# Patient Record
Sex: MALE | Race: WHITE | NOT HISPANIC OR LATINO | Employment: FULL TIME | URBAN - METROPOLITAN AREA
[De-identification: names, ages, dates, MRNs, and addresses within clinical notes are randomized per-mention and may not be internally consistent; named-entity substitution may affect disease eponyms.]

---

## 2024-07-26 ENCOUNTER — OFFICE VISIT (OUTPATIENT)
Dept: GASTROENTEROLOGY | Facility: CLINIC | Age: 49
End: 2024-07-26
Payer: COMMERCIAL

## 2024-07-26 VITALS
BODY MASS INDEX: 35.68 KG/M2 | DIASTOLIC BLOOD PRESSURE: 72 MMHG | HEIGHT: 68 IN | SYSTOLIC BLOOD PRESSURE: 130 MMHG | HEART RATE: 81 BPM | TEMPERATURE: 97.7 F | WEIGHT: 235.4 LBS | OXYGEN SATURATION: 97 %

## 2024-07-26 DIAGNOSIS — K21.9 GASTROESOPHAGEAL REFLUX DISEASE, UNSPECIFIED WHETHER ESOPHAGITIS PRESENT: Primary | ICD-10-CM

## 2024-07-26 DIAGNOSIS — Z12.11 SCREENING FOR COLON CANCER: ICD-10-CM

## 2024-07-26 DIAGNOSIS — R13.10 DYSPHAGIA, UNSPECIFIED TYPE: ICD-10-CM

## 2024-07-26 PROCEDURE — 99204 OFFICE O/P NEW MOD 45 MIN: CPT | Performed by: PHYSICIAN ASSISTANT

## 2024-07-26 RX ORDER — HYDROCORTISONE 10 MG/1
TABLET ORAL
COMMUNITY

## 2024-07-26 RX ORDER — ROSUVASTATIN CALCIUM 40 MG/1
40 TABLET, COATED ORAL DAILY
COMMUNITY

## 2024-07-26 RX ORDER — DIPHENHYDRAMINE HCL 50 MG
CAPSULE ORAL DAILY
COMMUNITY

## 2024-07-26 NOTE — PROGRESS NOTES
[facility] - Outpatient Consultation  Mart Edward 49 y.o. male MRN: [id number]  Encounter: [id number]          ASSESSMENT AND PLAN:      1. Gastroesophageal reflux disease  2. Dysphagia    Patient reports a long hx of GERD x 25 years.  He also has a long hx of occasional dysphagia.  He is on Prevacid OTC with good control of his heartburn.  He has never had an EGD.    Will plan for EGD to investigate for Rg's esophagus, HH, strictures/rings, etc.  Continue the PPI. GERD modifications.    3. Screening for colon cancer    He has never had a colonoscopy. No family history of colon cancer.    Will plan for colonoscopy at the same time as the EGD to investigate.    Note: He has a hx of a CM with EF of 35-39% and his last cardiologist had recommended a cardiac cath which he has not had yet.  He has a new appointment with our Cardiology team through [facility]. He will require cardiac clearance prior to the procedures.    ______________________________________________________________________    HPI:  Patient is a pleasant 49 year old male with a PMH of CAD, cardiomyopathy, COPD, rito disease who presents to the office to establish GI care. Patient reports a long history of GERD x 25 years.  He also has a long history of occasional dysphagia. He is on Prevacid OTC with good control of his heartburn. He previously was on Omeprazole but stopped it as he suspected it was causing memory issues. He has never had an EGD.  He has never had a colonoscopy. No family history of esophageal, stomach, or colon cancer.  No bloody stools.  Note: He has a history of a cardiomyopathy with EF of 35-39% and his last cardiologist had recommended a cardiac cath which he has not had yet.  He has a new appointment with our Cardiology team through [facility].      REVIEW OF SYSTEMS:    CONSTITUTIONAL: Denies any fever, chills, rigors, and weight loss.  HEENT: No earache or tinnitus. Denies hearing loss or  "visual disturbances.  CARDIOVASCULAR: No chest pain or palpitations.   RESPIRATORY: Denies any cough, hemoptysis, shortness of breath or dyspnea on exertion.  GASTROINTESTINAL: As noted in the History of Present Illness.   GENITOURINARY: No problems with urination. Denies any hematuria or dysuria.  NEUROLOGIC: No dizziness or vertigo, denies headaches.   MUSCULOSKELETAL: Denies any muscle or joint pain.   SKIN: Denies skin rashes or itching.   ENDOCRINE: Denies excessive thirst. Denies intolerance to heat or cold.  PSYCHOSOCIAL: Denies depression or anxiety. Denies any recent memory loss.       Historical Information   Past Medical History:   Diagnosis Date    Ryne disease (HCC)     COPD, mild (HCC)     Heart disease      Past Surgical History:   Procedure Laterality Date    CORONARY ARTERY BYPASS GRAFT  2014     Social History   Social History     Substance and Sexual Activity   Alcohol Use Never     Social History     Substance and Sexual Activity   Drug Use Yes    Types: Marijuana     Social History     Tobacco Use   Smoking Status Some Days    Types: Cigarettes   Smokeless Tobacco Never   Tobacco Comments    cigars     Family History   Problem Relation Age of Onset    Breast cancer Mother     Heart failure Mother     Heart disease Father     Diabetes type II Father     Diabetes type II Brother        Meds/Allergies       Current Outpatient Medications:     diphenhydrAMINE HCl, Sleep, (Unisom Sleepgels) 50 MG CAPS    hydrocortisone (CORTEF) 10 mg tablet    rosuvastatin (CRESTOR) 40 MG tablet    No Known Allergies        Objective     Blood pressure 130/72, pulse 81, temperature 97.7 °F (36.5 °C), temperature source Temporal, height 5' 8\" (1.727 m), weight 107 kg (235 lb 6.4 oz), SpO2 97%. Body mass index is 35.79 kg/m².        PHYSICAL EXAM:      General Appearance:   Alert, cooperative, no distress   HEENT:   Normocephalic, atraumatic, anicteric.     Neck:  Supple, symmetrical, trachea midline   Lungs:   " Clear to auscultation bilaterally; no rales, rhonchi or wheezing; respirations unlabored    Heart::   Regular rate and rhythm; no murmur, rub, or gallop.   Abdomen:   Soft, non-tender, non-distended; normal bowel sounds; no masses, no organomegaly    Genitalia:   Deferred    Rectal:   Deferred    Extremities:  No cyanosis, clubbing or edema    Pulses:  2+ and symmetric    Skin:  No jaundice, rashes, or lesions    Lymph nodes:  No palpable cervical lymphadenopathy        Lab Results:   No visits with results within 1 Day(s) from this visit.   Latest known visit with results is:   No results found for any previous visit.         Radiology Results:   No results found.

## 2024-08-23 ENCOUNTER — OFFICE VISIT (OUTPATIENT)
Age: 49
End: 2024-08-23
Payer: COMMERCIAL

## 2024-08-23 VITALS
RESPIRATION RATE: 18 BRPM | BODY MASS INDEX: 34.77 KG/M2 | DIASTOLIC BLOOD PRESSURE: 74 MMHG | SYSTOLIC BLOOD PRESSURE: 122 MMHG | OXYGEN SATURATION: 95 % | HEART RATE: 79 BPM | WEIGHT: 229.4 LBS | HEIGHT: 68 IN | TEMPERATURE: 96.9 F

## 2024-08-23 DIAGNOSIS — R56.9 SEIZURE (HCC): ICD-10-CM

## 2024-08-23 DIAGNOSIS — F17.290 CIGAR SMOKER: ICD-10-CM

## 2024-08-23 DIAGNOSIS — K21.9 GASTROESOPHAGEAL REFLUX DISEASE WITHOUT ESOPHAGITIS: ICD-10-CM

## 2024-08-23 DIAGNOSIS — Z11.59 NEED FOR HEPATITIS C SCREENING TEST: ICD-10-CM

## 2024-08-23 DIAGNOSIS — J44.9 CHRONIC OBSTRUCTIVE PULMONARY DISEASE, UNSPECIFIED COPD TYPE (HCC): ICD-10-CM

## 2024-08-23 DIAGNOSIS — Z11.4 SCREENING FOR HIV (HUMAN IMMUNODEFICIENCY VIRUS): ICD-10-CM

## 2024-08-23 DIAGNOSIS — I25.810 CORONARY ARTERY DISEASE INVOLVING CORONARY BYPASS GRAFT OF NATIVE HEART WITHOUT ANGINA PECTORIS: ICD-10-CM

## 2024-08-23 DIAGNOSIS — E66.9 CLASS 1 OBESITY WITHOUT SERIOUS COMORBIDITY WITH BODY MASS INDEX (BMI) OF 34.0 TO 34.9 IN ADULT, UNSPECIFIED OBESITY TYPE: ICD-10-CM

## 2024-08-23 DIAGNOSIS — E27.1 ADDISON DISEASE (HCC): ICD-10-CM

## 2024-08-23 DIAGNOSIS — Z76.89 ENCOUNTER TO ESTABLISH CARE: Primary | ICD-10-CM

## 2024-08-23 PROCEDURE — 99204 OFFICE O/P NEW MOD 45 MIN: CPT | Performed by: STUDENT IN AN ORGANIZED HEALTH CARE EDUCATION/TRAINING PROGRAM

## 2024-08-23 RX ORDER — LISINOPRIL 5 MG/1
5 TABLET ORAL DAILY
Qty: 30 TABLET | Refills: 2 | Status: SHIPPED | OUTPATIENT
Start: 2024-08-23 | End: 2024-09-22

## 2024-08-23 RX ORDER — CARVEDILOL 6.25 MG/1
6.25 TABLET ORAL 2 TIMES DAILY WITH MEALS
Qty: 60 TABLET | Refills: 2 | Status: SHIPPED | OUTPATIENT
Start: 2024-08-23 | End: 2024-08-26

## 2024-08-23 RX ORDER — LEVETIRACETAM 500 MG/1
500 TABLET ORAL 2 TIMES DAILY
Qty: 60 TABLET | Refills: 2 | Status: SHIPPED | OUTPATIENT
Start: 2024-08-23 | End: 2024-09-22

## 2024-08-23 RX ORDER — CARVEDILOL 6.25 MG/1
6.25 TABLET ORAL 2 TIMES DAILY WITH MEALS
COMMUNITY
End: 2024-08-23 | Stop reason: SDUPTHER

## 2024-08-23 RX ORDER — LEVETIRACETAM 500 MG/1
500 TABLET ORAL 2 TIMES DAILY
COMMUNITY
End: 2024-08-23 | Stop reason: SDUPTHER

## 2024-08-23 RX ORDER — LISINOPRIL 5 MG/1
5 TABLET ORAL DAILY
COMMUNITY
End: 2024-08-23 | Stop reason: SDUPTHER

## 2024-08-23 RX ORDER — ASPIRIN 81 MG/1
81 TABLET, CHEWABLE ORAL 2 TIMES DAILY
COMMUNITY

## 2024-08-23 NOTE — PROGRESS NOTES
[facility]  INTERNAL MEDICINE OFFICE VISIT     PATIENT INFORMATION     Mart Edward   49 y.o. male   MRN: [id number]    ASSESSMENT/PLAN     Diagnoses and all orders for this visit:    Encounter to establish care  Currently following with Cardiology, Endocrinology. Needs to establish with Neurology.    Coronary artery disease involving coronary bypass graft of native heart without angina pectoris  Patient is in process of switching Cardiologists due to insurance change. He has been having some SOB upon awakening that he is unsure is related to his heart or his lungs. He denies angina. His previous Cardiologist was discussing repeat catheterization as his most recent EF had gone from 40% to 35%. Patient is awaiting upcoming Cardiology appointment for further management.  -     lisinopril (ZESTRIL) 5 mg tablet; Take 1 tablet (5 mg total) by mouth daily  -     carvedilol (COREG) 6.25 mg tablet; Take 1 tablet (6.25 mg total) by mouth 2 (two) times a day with meals  -     Lipid panel; Future  -     Comprehensive metabolic panel; Future    Seizure (HCC)  Patient reports he only had one seizure but was diagnosed with epilepsy during that hospital stay. He was started on Keppra but never had follow up with Neurology.  Continue Keppra for now and establish with Neurology  -     levETIRAcetam (KEPPRA) 500 mg tablet; Take 1 tablet (500 mg total) by mouth 2 (two) times a day  -     Ambulatory Referral to Neurology; Future    Class 1 obesity without serious comorbidity with body mass index (BMI) of 34.0 to 34.9 in adult, unspecified obesity type  -     Hemoglobin A1C; Future  -     CBC and differential; Future  -     TSH, 3rd generation with Free T4 reflex; Future    [place] disease (HCC)  Follows with Endocrinology and is on Cortef. Has appt to establish with new Endocrinologist.    Gastroesophageal reflux disease without esophagitis  Well controlled on lansoprazole. Following with GI. Awaiting EGD/colonoscopy but  awaiting cardiac clearance for procedure.    Chronic obstructive pulmonary disease, unspecified COPD type (HCC)  Patient reports he was on albuterol PRN, spiriva, and advair in the past while seeing Pulmonology. He reports some SOB while awakening that improves after getting his day started. Spiriva was offered today but patient would like to follow up with Cardiology first to get their opinion on further workup for cardiac etiology. He declines Pulmonology referral at this time.    Need for hepatitis C screening test  -     Hepatitis C Antibody; Future    Screening for HIV (human immunodeficiency virus)  -     HIV 1/2 AG/AB w Reflex [facility] for 2 yr old and above; Future    Cigar smoker  Precontemplative. Risks and benefits of smoking cessation discussed. Will continue to encourage smoking cessation.      Schedule a follow-up appointment in 4 months.    HEALTH MAINTENANCE     Immunization History   Administered Date(s) Administered    INFLUENZA 12/09/2013, 10/28/2019, 10/28/2019    Tdap 06/29/2020     CHIEF COMPLAINT     Chief Complaint   Patient presents with    Establish Care      HISTORY OF PRESENT ILLNESS     Mart Edward is a 50 yo M with PMH of CAD s/p CABG, COPD, GERD, Ryne disease, seizure, who presents to establish care. Patient has not seen a PCP in 7 years but has been following with cardiologist and endocrinologist.  He used to see pulmonologist but not recently.  Patient reports that he has been having some recent shortness of breath, mostly when he wakes up in the morning that resolves after he gets his day going.  He is not sure if this is due to his heart or his lungs.  It was discussed with him that we could start inhaler for COPD but he would like to wait until he follows up with his cardiologist in October.  There are previous discussions about repeating cardiac catheterization as his most recent EF had gone down to 35% from 40%.  Patient has been following with GI for GERD.  He will be getting  "upper and lower endoscopy but is awaiting cardiac clearance for this.  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  In 2021, he had a seizure at work.  He was told by the neurologist at the hospital that he had epilepsy and was started on Keppra.  He did not follow-up with a neurologist after this.    REVIEW OF SYSTEMS     Review of Systems   Constitutional:  Negative for appetite change, chills and fever.   HENT:  Negative for congestion and sore throat.    Eyes:  Negative for visual disturbance.   Respiratory:  Negative for cough and shortness of breath.    Cardiovascular:  Negative for chest pain and leg swelling.   Gastrointestinal:  Negative for abdominal pain, constipation, diarrhea and nausea.   Genitourinary:  Negative for difficulty urinating and dysuria.   Musculoskeletal:  Negative for arthralgias and myalgias.   Skin:  Negative for rash.   Neurological:  Negative for dizziness, light-headedness and headaches.   Psychiatric/Behavioral:  Negative for confusion.      OBJECTIVE     Vitals:    08/23/24 1031   BP: 122/74   BP Location: Left arm   Patient Position: Sitting   Cuff Size: Standard   Pulse: 79   Resp: 18   Temp: (!) 96.9 °F (36.1 °C)   TempSrc: Tympanic   SpO2: 95%   Weight: 104 kg (229 lb 6.4 oz)   Height: 5' 8\" (1.727 m)     Physical Exam  Constitutional:       General: He is not in acute distress.  Eyes:      Conjunctiva/sclera: Conjunctivae normal.   Cardiovascular:      Rate and Rhythm: Normal rate and regular rhythm.      Heart sounds: Normal heart sounds.   Pulmonary:      Effort: Pulmonary effort is normal.      Breath sounds: Normal breath sounds.   Abdominal:      General: There is no distension.      Tenderness: There is no abdominal tenderness.   Musculoskeletal:      Right lower leg: No edema.      Left lower leg: No edema.   Skin:     General: Skin is warm and dry.   Neurological:      Mental Status: " He is alert.   Psychiatric:         Speech: Speech normal.         Behavior: Behavior normal. Behavior is cooperative.       CURRENT MEDICATIONS     Current Outpatient Medications:     aspirin 81 mg chewable tablet, Chew 81 mg 2 (two) times a day, Disp: , Rfl:     carvedilol (COREG) 6.25 mg tablet, Take 1 tablet (6.25 mg total) by mouth 2 (two) times a day with meals, Disp: 60 tablet, Rfl: 2    diphenhydrAMINE HCl, Sleep, (Unisom Sleepgels) 50 MG CAPS, Take by mouth if needed, Disp: , Rfl:     hydrocortisone (CORTEF) 10 mg tablet, Two tablets in the morning, one in the afternoon and one at bed time. 08/23/24, Disp: , Rfl:     Lansoprazole (PREVACID PO), Take 20 mg by mouth daily, Disp: , Rfl:     levETIRAcetam (KEPPRA) 500 mg tablet, Take 1 tablet (500 mg total) by mouth 2 (two) times a day, Disp: 60 tablet, Rfl: 2    lisinopril (ZESTRIL) 5 mg tablet, Take 1 tablet (5 mg total) by mouth daily, Disp: 30 tablet, Rfl: 2    rosuvastatin (CRESTOR) 40 MG tablet, Take 40 mg by mouth daily, Disp: , Rfl:     PAST MEDICAL & SURGICAL HISTORY     Past Medical History:   Diagnosis Date    Ryne disease (HCC)     COPD (chronic obstructive pulmonary disease) (HCC) 2002    COPD, mild (HCC)     Coronary artery disease     GERD (gastroesophageal reflux disease)     Heart disease     Myocardial infarction (HCC)     Pneumonia     Seizures (HCC)      Past Surgical History:   Procedure Laterality Date    CORONARY ARTERY BYPASS GRAFT  2014     SOCIAL & FAMILY HISTORY     Social History     Socioeconomic History    Marital status: /Civil Union     Spouse name: Not on file    Number of children: Not on file    Years of education: Not on file    Highest education level: Not on file   Occupational History    Not on file   Tobacco Use    Smoking status: Some Days     Types: Cigars    Smokeless tobacco: Never    Tobacco comments:     cigars   Vaping Use    Vaping status: Never Used   Substance and Sexual Activity    Alcohol use: Never     Drug use: Yes     Types: Marijuana    Sexual activity: Yes     Partners: Female     Birth control/protection: None   Other Topics Concern    Not on file   Social History Narrative    Not on file     Social Determinants of Health     Financial Resource Strain: Not on file   Food Insecurity: Not on file   Transportation Needs: Not on file   Physical Activity: Not on file   Stress: Not on file   Social Connections: Not on file   Intimate Partner Violence: Not on file   Housing Stability: Not on file     Social History     Substance and Sexual Activity   Alcohol Use Never       Social History     Substance and Sexual Activity   Drug Use Yes    Types: Marijuana     Social History     Tobacco Use   Smoking Status Some Days    Types: Cigars   Smokeless Tobacco Never   Tobacco Comments    cigars     Family History   Problem Relation Age of Onset    Breast cancer Mother     Heart failure Mother     Cancer Mother     Heart disease Father     Diabetes type II Father     Dementia Father     Diabetes Father     Diabetes type II Brother     Diabetes Brother              ==  Nahum Donis MD  [facility]

## 2024-08-26 ENCOUNTER — TELEPHONE (OUTPATIENT)
Age: 49
End: 2024-08-26

## 2024-08-26 DIAGNOSIS — I25.810 CORONARY ARTERY DISEASE INVOLVING CORONARY BYPASS GRAFT OF NATIVE HEART WITHOUT ANGINA PECTORIS: ICD-10-CM

## 2024-08-26 RX ORDER — CARVEDILOL 6.25 MG/1
12.5 TABLET ORAL 2 TIMES DAILY WITH MEALS
Qty: 120 TABLET | Refills: 2 | Status: SHIPPED | OUTPATIENT
Start: 2024-08-26 | End: 2024-09-25

## 2024-08-26 NOTE — TELEPHONE ENCOUNTER
Patient said the carvedilol (COREG) 6.25 mg tablet was sent in incorrect. It should be 2 tabs (12.5 mg) twice a day. Please send new script to     [id number] - [place] [address] [address]

## 2024-09-26 ENCOUNTER — APPOINTMENT (OUTPATIENT)
Age: 49
End: 2024-09-26
Payer: COMMERCIAL

## 2024-09-26 DIAGNOSIS — Z11.59 NEED FOR HEPATITIS C SCREENING TEST: ICD-10-CM

## 2024-09-26 DIAGNOSIS — Z11.4 SCREENING FOR HIV (HUMAN IMMUNODEFICIENCY VIRUS): ICD-10-CM

## 2024-09-26 DIAGNOSIS — E66.9 CLASS 1 OBESITY WITHOUT SERIOUS COMORBIDITY WITH BODY MASS INDEX (BMI) OF 34.0 TO 34.9 IN ADULT, UNSPECIFIED OBESITY TYPE: ICD-10-CM

## 2024-09-26 DIAGNOSIS — I25.810 CORONARY ARTERY DISEASE INVOLVING CORONARY BYPASS GRAFT OF NATIVE HEART WITHOUT ANGINA PECTORIS: ICD-10-CM

## 2024-09-26 DIAGNOSIS — E66.811 CLASS 1 OBESITY WITHOUT SERIOUS COMORBIDITY WITH BODY MASS INDEX (BMI) OF 34.0 TO 34.9 IN ADULT, UNSPECIFIED OBESITY TYPE: ICD-10-CM

## 2024-09-26 LAB
ALBUMIN SERPL BCG-MCNC: 4.4 G/DL (ref 3.5–5)
ALP SERPL-CCNC: 64 U/L (ref 34–104)
ALT SERPL W P-5'-P-CCNC: 28 U/L (ref 7–52)
ANION GAP SERPL CALCULATED.3IONS-SCNC: 7 MMOL/L (ref 4–13)
AST SERPL W P-5'-P-CCNC: 19 U/L (ref 13–39)
BASOPHILS # BLD AUTO: 0.06 THOUSANDS/ΜL (ref 0–0.1)
BASOPHILS NFR BLD AUTO: 1 % (ref 0–1)
BILIRUB SERPL-MCNC: 0.66 MG/DL (ref 0.2–1)
BUN SERPL-MCNC: 13 MG/DL (ref 5–25)
CALCIUM SERPL-MCNC: 9.3 MG/DL (ref 8.4–10.2)
CHLORIDE SERPL-SCNC: 105 MMOL/L (ref 96–108)
CHOLEST SERPL-MCNC: 167 MG/DL
CO2 SERPL-SCNC: 25 MMOL/L (ref 21–32)
CREAT SERPL-MCNC: 0.88 MG/DL (ref 0.6–1.3)
EOSINOPHIL # BLD AUTO: 0.18 THOUSAND/ΜL (ref 0–0.61)
EOSINOPHIL NFR BLD AUTO: 2 % (ref 0–6)
ERYTHROCYTE [DISTWIDTH] IN BLOOD BY AUTOMATED COUNT: 12.5 % (ref 11.6–15.1)
EST. AVERAGE GLUCOSE BLD GHB EST-MCNC: 117 MG/DL
GFR SERPL CREATININE-BSD FRML MDRD: 101 ML/MIN/1.73SQ M
GLUCOSE P FAST SERPL-MCNC: 69 MG/DL (ref 65–99)
HBA1C MFR BLD: 5.7 %
HCT VFR BLD AUTO: 48.3 % (ref 36.5–49.3)
HCV AB SER QL: NORMAL
HDLC SERPL-MCNC: 53 MG/DL
HGB BLD-MCNC: 16.4 G/DL (ref 12–17)
HIV 1+2 AB+HIV1 P24 AG SERPL QL IA: NORMAL
HIV 2 AB SERPL QL IA: NORMAL
HIV1 AB SERPL QL IA: NORMAL
HIV1 P24 AG SERPL QL IA: NORMAL
IMM GRANULOCYTES # BLD AUTO: 0.03 THOUSAND/UL (ref 0–0.2)
IMM GRANULOCYTES NFR BLD AUTO: 0 % (ref 0–2)
LDLC SERPL CALC-MCNC: 88 MG/DL (ref 0–100)
LYMPHOCYTES # BLD AUTO: 2.07 THOUSANDS/ΜL (ref 0.6–4.47)
LYMPHOCYTES NFR BLD AUTO: 22 % (ref 14–44)
MCH RBC QN AUTO: 30.3 PG (ref 26.8–34.3)
MCHC RBC AUTO-ENTMCNC: 34 G/DL (ref 31.4–37.4)
MCV RBC AUTO: 89 FL (ref 82–98)
MONOCYTES # BLD AUTO: 1.07 THOUSAND/ΜL (ref 0.17–1.22)
MONOCYTES NFR BLD AUTO: 12 % (ref 4–12)
NEUTROPHILS # BLD AUTO: 5.92 THOUSANDS/ΜL (ref 1.85–7.62)
NEUTS SEG NFR BLD AUTO: 63 % (ref 43–75)
NONHDLC SERPL-MCNC: 114 MG/DL
NRBC BLD AUTO-RTO: 0 /100 WBCS
PLATELET # BLD AUTO: 271 THOUSANDS/UL (ref 149–390)
PMV BLD AUTO: 11.2 FL (ref 8.9–12.7)
POTASSIUM SERPL-SCNC: 4.1 MMOL/L (ref 3.5–5.3)
PROT SERPL-MCNC: 7 G/DL (ref 6.4–8.4)
RBC # BLD AUTO: 5.41 MILLION/UL (ref 3.88–5.62)
SODIUM SERPL-SCNC: 137 MMOL/L (ref 135–147)
TRIGL SERPL-MCNC: 129 MG/DL
TSH SERPL DL<=0.05 MIU/L-ACNC: 1.73 UIU/ML (ref 0.45–4.5)
WBC # BLD AUTO: 9.33 THOUSAND/UL (ref 4.31–10.16)

## 2024-09-26 PROCEDURE — 87389 HIV-1 AG W/HIV-1&-2 AB AG IA: CPT

## 2024-09-26 PROCEDURE — 36415 COLL VENOUS BLD VENIPUNCTURE: CPT

## 2024-09-26 PROCEDURE — 83036 HEMOGLOBIN GLYCOSYLATED A1C: CPT

## 2024-09-26 PROCEDURE — 80053 COMPREHEN METABOLIC PANEL: CPT

## 2024-09-26 PROCEDURE — 86803 HEPATITIS C AB TEST: CPT

## 2024-09-26 PROCEDURE — 84443 ASSAY THYROID STIM HORMONE: CPT

## 2024-09-26 PROCEDURE — 80061 LIPID PANEL: CPT

## 2024-09-26 PROCEDURE — 85025 COMPLETE CBC W/AUTO DIFF WBC: CPT

## 2024-09-30 ENCOUNTER — TELEPHONE (OUTPATIENT)
Age: 49
End: 2024-09-30

## 2024-09-30 NOTE — TELEPHONE ENCOUNTER
----- Message from Nahum Donis MD sent at 9/30/2024  9:02 AM EDT -----  Please let patient know that his labs showed his A1c was just in the prediabetic range but his fasting sugar was very good. He can still try cutting down on high carb/sugary foods/beverages. Otherwise, his labs looked good

## 2024-10-09 ENCOUNTER — OFFICE VISIT (OUTPATIENT)
Dept: NEUROLOGY | Facility: CLINIC | Age: 49
End: 2024-10-09
Payer: COMMERCIAL

## 2024-10-09 VITALS
OXYGEN SATURATION: 95 % | DIASTOLIC BLOOD PRESSURE: 60 MMHG | HEIGHT: 68 IN | SYSTOLIC BLOOD PRESSURE: 100 MMHG | BODY MASS INDEX: 36.77 KG/M2 | HEART RATE: 75 BPM | WEIGHT: 242.6 LBS

## 2024-10-09 DIAGNOSIS — R56.9 SEIZURE-LIKE ACTIVITY (HCC): Primary | ICD-10-CM

## 2024-10-09 PROCEDURE — 99204 OFFICE O/P NEW MOD 45 MIN: CPT | Performed by: STUDENT IN AN ORGANIZED HEALTH CARE EDUCATION/TRAINING PROGRAM

## 2024-10-09 NOTE — PROGRESS NOTES
"Epilepsy Ambulatory Visit  Name: Mart Edward       : 1975       MRN: [id number]   Encounter Provider: Veronica Martini MD   Encounter Date: 10/9/2024  Encounter department: [facility]    Mart Edward is a 49 y.o. male with PMH of CAD s/p CABG, Wyano's disease, and COPD who presents for evaluation of a one-time seizure-like event in .     The preceding aura of lightheadedness and blurred vision sounds pre-syncopal. However, he was reportedly foaming at the mouth, shaking and unconscious for 7 minutes. His event has not recurred. Since he is currently off of levetiracetam, I will keep him off and get a routine EEG to evaluate his seizure tendency.  Assessment & Plan  Seizure-like activity (HCC)  - continue off of levetiracetam  - sleep-deprived EEG  Orders:    Ambulatory Referral to Neurology    EEG Sleep deprived; Future    RTC 3 mo         HISTORY OF PRESENT ILLNESS    Per patient:  He states he had his first and only seizure in 2021, at work. He remembers that his vision narrowed, he got lightheaded, lasting about a minute, and the next thing he knows he was waking up on the floor. The witness saw him foaming at the mouth and shaking, and he was unconscious for 7-10 minutes.     He does not remember feeling sore, having had tongue bite or urinary incontinence, and he was not tired. In the hospital, they found that the had COVID. He was started on Keppra.     He stopped taking Keppra between  -  of this year due to loss of insurance. He restarted it in July but began to have severe headaches. He stopped taking Keppra, and his headaches resolved.     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 As per the patient, when he was at work he had a syncopal episode. As " "per him, usually starts working at 5:00 a.m. in the morning and usually does his lunch at 11:30 a.m. 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 He has started falling but his manager caught him before he hitting the floor. The episode was witnessed by his colleagues and he told that they mentioned that he was shaking and had foam in his mouth. As per him he lost consciousness for 7 minutes, when ED gained consciousness he knew his surroundings and his colleagues. He did not had any urinary incontinence or any tongue bite.      He had syncopal episode 2 months ago when he was washing his hand after he work up in the middle of the night for urination.      In ER patient found to have positive orthostasis.\"    Epilepsy Risk Factors: Patient is a product of uncomplicated full term pregnancy, met appropriate development milestones. No learning disabilities or cognitive delay. No h/o febrile seizures, CNS infections, strokes, or CNS neoplasms.  There is no family h/o of seizures or epilepsy.    Prior Work-up:   MRI Brain (2/11/21), [facility]: Normal   REEGs (4/1/21), [facility]: Normal EEG    Past Medical History:    Past Medical History:   Diagnosis Date    [place] disease (HCC)     COPD (chronic obstructive pulmonary disease) (HCC) 2002    COPD, mild (HCC)     Coronary artery disease     GERD (gastroesophageal reflux disease)     Heart disease     Myocardial infarction (HCC)     Pneumonia     Seizures (HCC)      Past Surgical History:   Procedure Laterality Date    CORONARY ARTERY BYPASS GRAFT  2014       Family History:  Family History   Problem Relation Age of Onset    Breast cancer Mother     Heart failure Mother     Cancer Mother     Heart disease Father     Diabetes type II Father     Dementia Father     Diabetes Father     Diabetes type II Brother     Diabetes Brother      There is no family history of epilepsy.     Social " "History:  Social History     Tobacco Use    Smoking status: Some Days     Types: Cigars    Smokeless tobacco: Never    Tobacco comments:     cigars   Vaping Use    Vaping status: Never Used   Substance Use Topics    Alcohol use: Never    Drug use: Yes     Types: Marijuana      Driving: No    Allergies:  No Known Allergies    Medications:    Current Outpatient Medications:     aspirin 81 mg chewable tablet, Chew 81 mg 2 (two) times a day, Disp: , Rfl:     carvedilol (COREG) 6.25 mg tablet, Take 2 tablets (12.5 mg total) by mouth 2 (two) times a day with meals, Disp: 120 tablet, Rfl: 2    hydrocortisone (CORTEF) 10 mg tablet, Two tablets in the morning, one in the afternoon and one at bed time. 08/23/24, Disp: , Rfl:     Lansoprazole (PREVACID PO), Take 20 mg by mouth daily, Disp: , Rfl:     lisinopril (ZESTRIL) 5 mg tablet, Take 1 tablet (5 mg total) by mouth daily, Disp: 30 tablet, Rfl: 2    rosuvastatin (CRESTOR) 40 MG tablet, Take 40 mg by mouth daily, Disp: , Rfl:     diphenhydrAMINE HCl, Sleep, (Unisom Sleepgels) 50 MG CAPS, Take by mouth if needed (Patient not taking: Reported on 10/9/2024), Disp: , Rfl:     levETIRAcetam (KEPPRA) 500 mg tablet, Take 1 tablet (500 mg total) by mouth 2 (two) times a day (Patient not taking: Reported on 10/9/2024), Disp: 60 tablet, Rfl: 2      OBJECTIVE  /60 (BP Location: Left arm, Patient Position: Sitting, Cuff Size: Large)   Pulse 75   Ht 5' 8\" (1.727 m)   Wt 110 kg (242 lb 9.6 oz)   SpO2 95%   BMI 36.89 kg/m²      Labs  I have reviewed pertinent labs:  CBC:   Lab Results   Component Value Date    WBC 9.33 09/26/2024    RBC 5.41 09/26/2024    HGB 16.4 09/26/2024    HCT 48.3 09/26/2024    MCV 89 09/26/2024     09/26/2024    MCH 30.3 09/26/2024    MCHC 34.0 09/26/2024    RDW 12.5 09/26/2024    MPV 11.2 09/26/2024    NEUTROABS 5.92 09/26/2024     CMP:   Lab Results   Component Value Date    SODIUM 137 09/26/2024    K 4.1 09/26/2024     09/26/2024    CO2 25 " 09/26/2024    AGAP 7 09/26/2024    BUN 13 09/26/2024    CREATININE 0.88 09/26/2024    GLUC 127 (H) 04/21/2023    GLUF 69 09/26/2024    CALCIUM 9.3 09/26/2024    AST 19 09/26/2024    ALT 28 09/26/2024    ALKPHOS 64 09/26/2024    TP 7.0 09/26/2024    ALB 4.4 09/26/2024    TBILI 0.66 09/26/2024    EGFR 101 09/26/2024       Lab Results   Component Value Date/Time    Hemoglobin A1C 5.7 (H) 09/26/2024 09:29 AM    HIV Ag-Ab 5th Gen Non-Reactive 09/26/2024 09:29 AM            General Exam  GENERAL APPEARANCE:  No distress, alert, interactive and cooperative.  CARDIOVASCULAR: Warm and well perfused  LUNGS: normal work of breathing on room air  EXTREMITIES: no peripheral edema     Neurologic Exam  MENTAL STATE:  Orientation was normal to time, place and person. Recent and remote memory was intact.  Attention span and concentration were normal. Language testing was normal for comprehension, repetition, expression, and naming.      CRANIAL NERVES:  CN 3, 4, 6  Pupils round, 4 mm in diameter, equally reactive to light. Lids symmetric; no ptosis. EOMs normal alignment, full range. No nystagmus.  CN 5  Facial sensation intact bilaterally.  CN 7  Normal and symmetric facial strength.   CN 8  Hearing intact to finger rub  CN 9  Palate elevates symmetrically.  CN 11  Normal strength of shoulder shrug and neck turning.  CN 12  Tongue midline, with normal bulk and strength.     MOTOR:  Muscle bulk and tone were normal in both upper and lower extremities. Muscle strength was 5/5 in distal and proximal muscles in both upper and lower extremities. No fasciculations, tremor or other abnormal movements were present.     REFLEXES:  RIGHT UE   LEFT UE  BR:2              BR:2    Biceps:2      Biceps:2    Triceps:2     Triceps:2       RIGHT LLE   LEFT LLE    Knee:2           Knee:2    Ankle:2         Ankle:2       SENSORY:  In both upper and lower extremities, sensation was intact to light touch.     COORDINATION:   In both upper extremities,  finger-nose-finger was intact without dysmetria or overshoot. In both lower extremities, heel-to-shin was intact.     GAIT:  Station was stable with a normal base. Gait was stable with a normal arm swing and speed. Tandem gait was intact. No Romberg sign was present.    Administrative Statements   The total amount of time spent with the patient and on chart review and documentation was 30 minutes.     Decision making was moderate complexity due to undiagnosed new problem with uncertain diagnosis, as well as review of external notes and labwork.

## 2024-11-05 ENCOUNTER — OFFICE VISIT (OUTPATIENT)
Age: 49
End: 2024-11-05
Payer: COMMERCIAL

## 2024-11-05 VITALS
DIASTOLIC BLOOD PRESSURE: 70 MMHG | SYSTOLIC BLOOD PRESSURE: 120 MMHG | HEART RATE: 74 BPM | BODY MASS INDEX: 36.07 KG/M2 | WEIGHT: 238 LBS | TEMPERATURE: 97.8 F | HEIGHT: 68 IN | OXYGEN SATURATION: 94 %

## 2024-11-05 DIAGNOSIS — E27.1 ADDISON DISEASE (HCC): Primary | ICD-10-CM

## 2024-11-05 PROCEDURE — 99244 OFF/OP CNSLTJ NEW/EST MOD 40: CPT | Performed by: STUDENT IN AN ORGANIZED HEALTH CARE EDUCATION/TRAINING PROGRAM

## 2024-11-05 NOTE — PROGRESS NOTES
Ambulatory Visit  Name: Mart Edward      : 1975      MRN: [id number]  Encounter Provider: Nanette Thomas MD  Encounter Date: 2024   Encounter department: [facility] [place]    Assessment & Plan  [place] disease ([facility])  History of [place] disease since  on glucocorticoid replacement therapy with hydrocortisone 20 mg in the morning, 10 mg at noon and 10 mg in the evening.  He is clinically well supplemented,with no clinical symptoms concerning for undertreated AI. No evidence of Cushing's. I will check am ACTH before morning hydrocortisone dose.   We will continue current dose.  He is not currently on mineralocorticoid therapy, I ordered aldosterone, renin activity,     We discussed he should wear a medical alert bracelet or necklace       During symptoms of a cold or flu, it is safe to double or triple the daily dose of glucocorticoid for three days without calling the doctor. However, if symptoms become worse during that time, or if it is not possible to return to the usual glucocorticoid dose on the fourth day, we should be called for advice.     It is important to call us if there are any signs of a stomach illness with nausea, diarrhea, or vomiting. It is also important to call before any medical procedures or surgery. The provider will usually recommend temporarily increasing the glucocorticoid dose for these types of stresses.  Orders:    Aldosterone; Future    Renin Activity, Plasma; Future    ACTH; Future    GAD65, IA-2, and Insulin Autoantibody; Future    Thyroid Antibodies Panel    hydrocortisone (Solu-CORTEF) injection 100 mg      History of Present Illness     Mart Edward is a 49 y.o. male who presents to establish care with us for adrenal insufficiency due to [place]'s disease.    He was following with [facility] endocrinology and last visit was in May 2023.    He is currently on Hydrocortisone 20 mg at 7 am, 10 mg at 12 pm and 10 mg at 8 pm.    As per  "chart, he was evaluated for fatigue, significant weight loss as well as hyperpigmentation,AM cortisol was 0.7, Cosyntropin stim test failed with cortisol rising from 0.7 to 0.8 in 1 hour. CT scan showed normal adrenal glands. ACTH >1200, adrenal antibody positive.     Hypothyroidism: no   Daytime somnolence, yes  Nausea, no   Ability to concentrate, yes  Weight loss: no  Lightheadedness: no  Muscle cramping: no  Salt craving: yes    History obtained from : patient,negative except as mentioned in HPI,    Review of Systems   Constitutional:  Negative for appetite change, fatigue and unexpected weight change.   HENT:  Negative for trouble swallowing.    Eyes:  Negative for visual disturbance.   Cardiovascular:  Negative for chest pain and palpitations.   Gastrointestinal:  Negative for abdominal pain, constipation, diarrhea, nausea and vomiting.   Endocrine: Negative for polydipsia, polyphagia and polyuria.     Medical History Reviewed by provider this encounter:       Current Outpatient Medications on File Prior to Visit   Medication Sig Dispense Refill    aspirin 81 mg chewable tablet Chew 81 mg 2 (two) times a day      carvedilol (COREG) 6.25 mg tablet Take 2 tablets (12.5 mg total) by mouth 2 (two) times a day with meals 120 tablet 2    hydrocortisone (CORTEF) 10 mg tablet Two tablets in the morning, one in the afternoon and one at bed time. 08/23/24      Lansoprazole (PREVACID PO) Take 20 mg by mouth daily      lisinopril (ZESTRIL) 5 mg tablet Take 1 tablet (5 mg total) by mouth daily 30 tablet 2    rosuvastatin (CRESTOR) 40 MG tablet Take 40 mg by mouth daily       No current facility-administered medications on file prior to visit.          Objective     /70 (BP Location: Right arm, Patient Position: Sitting, Cuff Size: Standard)   Pulse 74   Temp 97.8 °F (36.6 °C)   Ht 5' 8\" (1.727 m)   Wt 108 kg (238 lb)   SpO2 94%   BMI 36.19 kg/m²     Physical Exam  Vitals and nursing note reviewed. "   Constitutional:       General: He is not in acute distress.     Appearance: He is well-developed.   HENT:      Head: Normocephalic and atraumatic.   Eyes:      Conjunctiva/sclera: Conjunctivae normal.   Cardiovascular:      Rate and Rhythm: Normal rate and regular rhythm.      Heart sounds: No murmur heard.  Pulmonary:      Effort: Pulmonary effort is normal. No respiratory distress.      Breath sounds: Normal breath sounds.   Musculoskeletal:         General: No swelling.      Cervical back: Neck supple.   Skin:     General: Skin is warm and dry.   Neurological:      Mental Status: He is alert.   Psychiatric:         Mood and Affect: Mood normal.       Administrative Statements   I have spent a total time of 40 minutes in caring for this patient on the day of the visit/encounter including Diagnostic results, Instructions for management, Patient and family education, Importance of tx compliance, Risk factor reductions, Impressions, Counseling / Coordination of care, Documenting in the medical record, Reviewing / ordering tests, medicine, procedures  , and Obtaining or reviewing history  .       Contains abnormal data ACTH    Component  Ref Range & Units 10 yr ago   Adrenocorticotropic Hormone  0 - 46 pg/mL >1,250.0 High      Component 10 yr ago Comments   ADRENAL AB REPORT (NOTE)  --------------TESTS-------------RESULTS--------UNITS--REF. RANGE---  Adrenal Ab                   POSITIVE A                                           Reference Range:                             NEGATIVE IN NORMAL                     Component 04/19/23 02/10/21 10/11/19 10/05/17 12/10/13 12/10/13   Cortisol 0.3 Low   0.5 Low  12.2  1.6 Low   0.8  0.7

## 2024-11-06 NOTE — ASSESSMENT & PLAN NOTE
History of [place] disease since 2013 on glucocorticoid replacement therapy with hydrocortisone 20 mg in the morning, 10 mg at noon and 10 mg in the evening.  He is clinically well supplemented,with no clinical symptoms concerning for undertreated AI. No evidence of Cushing's. I will check am ACTH before morning hydrocortisone dose.   We will continue current dose.  He is not currently on mineralocorticoid therapy, I ordered aldosterone, renin activity,     We discussed he should wear a medical alert bracelet or necklace       During symptoms of a cold or flu, it is safe to double or triple the daily dose of glucocorticoid for three days without calling the doctor. However, if symptoms become worse during that time, or if it is not possible to return to the usual glucocorticoid dose on the fourth day, we should be called for advice.     It is important to call us if there are any signs of a stomach illness with nausea, diarrhea, or vomiting. It is also important to call before any medical procedures or surgery. The provider will usually recommend temporarily increasing the glucocorticoid dose for these types of stresses.  Orders:    Aldosterone; Future    Renin Activity, Plasma; Future    ACTH; Future    GAD65, IA-2, and Insulin Autoantibody; Future    Thyroid Antibodies Panel    hydrocortisone (Solu-CORTEF) injection 100 mg

## 2024-11-12 ENCOUNTER — HOSPITAL ENCOUNTER (OUTPATIENT)
Dept: NEUROLOGY | Facility: HOSPITAL | Age: 49
Discharge: HOME | End: 2024-11-12
Attending: STUDENT IN AN ORGANIZED HEALTH CARE EDUCATION/TRAINING PROGRAM
Payer: COMMERCIAL

## 2024-11-12 DIAGNOSIS — R56.9 SEIZURE-LIKE ACTIVITY (HCC): ICD-10-CM

## 2024-11-12 PROCEDURE — 95819 EEG AWAKE AND ASLEEP: CPT | Performed by: PSYCHIATRY & NEUROLOGY

## 2024-11-12 PROCEDURE — 95819 EEG AWAKE AND ASLEEP: CPT

## 2024-11-14 ENCOUNTER — RESULTS FOLLOW-UP (OUTPATIENT)
Dept: NEUROLOGY | Facility: CLINIC | Age: 49
End: 2024-11-14

## 2024-11-18 ENCOUNTER — OFFICE VISIT (OUTPATIENT)
Dept: CARDIOLOGY CLINIC | Facility: CLINIC | Age: 49
End: 2024-11-18
Payer: COMMERCIAL

## 2024-11-18 VITALS
OXYGEN SATURATION: 98 % | SYSTOLIC BLOOD PRESSURE: 110 MMHG | DIASTOLIC BLOOD PRESSURE: 66 MMHG | WEIGHT: 243 LBS | RESPIRATION RATE: 16 BRPM | BODY MASS INDEX: 36.83 KG/M2 | HEIGHT: 68 IN | HEART RATE: 82 BPM

## 2024-11-18 DIAGNOSIS — J44.9 CHRONIC OBSTRUCTIVE PULMONARY DISEASE, UNSPECIFIED COPD TYPE (HCC): ICD-10-CM

## 2024-11-18 DIAGNOSIS — I25.5 ISCHEMIC CARDIOMYOPATHY: ICD-10-CM

## 2024-11-18 DIAGNOSIS — I25.810 CORONARY ARTERY DISEASE INVOLVING CORONARY BYPASS GRAFT OF NATIVE HEART WITHOUT ANGINA PECTORIS: Primary | ICD-10-CM

## 2024-11-18 PROCEDURE — 99244 OFF/OP CNSLTJ NEW/EST MOD 40: CPT | Performed by: INTERNAL MEDICINE

## 2024-11-18 PROCEDURE — 99204 OFFICE O/P NEW MOD 45 MIN: CPT | Performed by: INTERNAL MEDICINE

## 2024-11-18 NOTE — PROGRESS NOTES
Cardiology Consultation     Mart Edward  [id number]  1975  [facility]  [facility] [place] [address]    Impression:  Inferior infarct/CAD s/p CABG (SVG to OM1 and OM2 and radial to RCA)in 3/31/2014 with patent grafts on cath in 2/2017  Ischemic cardiomyopathy with improved LVEF of 45-49%  COPD/ILD   Seizures  [place]'s disease on chronic hydrocortisone therapy  Hypertension  Hypothyroidism   Tobacco use with cessation in 2005  History COVID 19 in 2/2021 with seizure    Plan:  He is doing well, asymptomatic from a cardiovascular standpoint.  Unfortunately the echo from 11/2023 revealed EF 35%, the only thing listed on the report.  Repeat echocardiogram.   Continue the current medications including aspirin, carvedilol, lisinopril and rosuvastatin.  Last LDL 78 was in January 2023, near goal, repeat.      Mart Edward is a 48 year old who presents for follow up of CAD and Ischemic Cardiomyopathy, transferring his care from [facility]. He feels well with no cp, sob, sharif, swelling, syncope. He doesn't exercise much. HE was off meds due to ins for a year when the ef of 35% was noted. He had one seizure while sick with COVID      Previous outside cardiac testing:  Echocardiogram [facility] 11/7/2023: Ejection fraction 35%.    Echo, 5/4/2022  The qualitative LV ejection fraction is 45-49% (mildly reduced).  The mid to basal inferior and inferolateral segments are bright and severely hypokinetic suggestive of scar. Other walls contract  grossly normally.  The left ventricular diastolic function is normal.  The right ventricular systolic function is normal as assessed by tricuspid annular plane systolic excursion (TAPSE) (normal >1.7 cm).  The tricuspid regurgitation spectral Doppler examination is inadequate to calculate the right ventricular systolic pressure.    Echo, 2/10/21  The examination is technically  difficult. Definity was used for contrast enhancement of the myocardium.  The qualitative LV ejection fraction is 40-44% (mildly reduced).  The left ventricular cavity size is mildly enlarged.  The mid and basal inferior/inferolateral walls are akinetic and bright suggestive of a scar  The left ventricular diastolic function is normal.  No significant valvular abnormalities.  Trivial tricuspid regurgitation is present. The signal is inadequate to calculate pulmo  nary artery systolic pressure.  No pericardial effusion is noted.    12 day Zio, 3/4/21: Benign event monitor with no reported symptoms. Clinical correlation advised. Please confer above for complete details.          Patient Active Problem List   Diagnosis    COPD (chronic obstructive pulmonary disease) (HCC)    Coronary artery disease involving coronary bypass graft of native heart    GERD (gastroesophageal reflux disease)    Thayer disease (HCC)    Seizure-like activity (HCC)     Past Medical History:   Diagnosis Date    Thayer disease (HCC)     COPD (chronic obstructive pulmonary disease) (HCC) 2002    COPD, mild (HCC)     Coronary artery disease     GERD (gastroesophageal reflux disease)     Heart disease     Myocardial infarction (HCC)     Pneumonia     Seizures (HCC)      Social History     Socioeconomic History    Marital status: /Civil Union     Spouse name: Not on file    Number of children: Not on file    Years of education: Not on file    Highest education level: Not on file   Occupational History    Not on file   Tobacco Use    Smoking status: Some Days     Types: Cigars    Smokeless tobacco: Never    Tobacco comments:     cigars   Vaping Use    Vaping status: Never Used   Substance and Sexual Activity    Alcohol use: Never    Drug use: Yes     Types: Marijuana    Sexual activity: Yes     Partners: Female     Birth control/protection: None   Other Topics Concern    Not on file   Social History Narrative    Not on file     Social  "Drivers of Health     Financial Resource Strain: Not on file   Food Insecurity: Not on file   Transportation Needs: Not on file   Physical Activity: Not on file   Stress: Not on file   Social Connections: Not on file   Intimate Partner Violence: Not on file   Housing Stability: Not on file      Family History   Problem Relation Age of Onset    Breast cancer Mother     Heart failure Mother     Cancer Mother     Heart disease Father     Diabetes type II Father     Dementia Father     Diabetes Father     Diabetes type II Brother     Diabetes Brother      Past Surgical History:   Procedure Laterality Date    CORONARY ARTERY BYPASS GRAFT  2014       Current Outpatient Medications:     aspirin 81 mg chewable tablet, Chew 81 mg 2 (two) times a day, Disp: , Rfl:     carvedilol (COREG) 6.25 mg tablet, Take 2 tablets (12.5 mg total) by mouth 2 (two) times a day with meals, Disp: 120 tablet, Rfl: 2    hydrocortisone (CORTEF) 10 mg tablet, Two tablets in the morning, one in the afternoon and one at bed time. 08/23/24, Disp: , Rfl:     Lansoprazole (PREVACID PO), Take 20 mg by mouth daily, Disp: , Rfl:     lisinopril (ZESTRIL) 5 mg tablet, Take 1 tablet (5 mg total) by mouth daily, Disp: 30 tablet, Rfl: 2    rosuvastatin (CRESTOR) 40 MG tablet, Take 40 mg by mouth daily, Disp: , Rfl:     Current Facility-Administered Medications:     hydrocortisone (Solu-CORTEF) injection 100 mg, 100 mg, Intramuscular, Once PRN,   No Known Allergies  Vitals:    11/18/24 1513   Resp: 16   Height: 5' 8\" (1.727 m)       Labs:  Appointment on 09/26/2024   Component Date Value    Hepatitis C Ab 09/26/2024 Non-reactive     HIV-1 p24 Antigen 09/26/2024 Non-Reactive     HIV-1 Antibody 09/26/2024 Non-Reactive     HIV-2 Antibody 09/26/2024 Non-Reactive     HIV Ag-Ab 5th Gen 09/26/2024 Non-Reactive     Hemoglobin A1C 09/26/2024 5.7 (H)     EAG 09/26/2024 117     Cholesterol 09/26/2024 167     Triglycerides 09/26/2024 129     HDL, Direct 09/26/2024 53     " LDL Calculated 2024 88     Non-HDL-Chol (CHOL-HDL) 2024 114     Sodium 2024 137     Potassium 2024 4.1     Chloride 2024 105     CO2 2024 25     ANION GAP 2024 7     BUN 2024 13     Creatinine 2024 0.88     Glucose, Fasting 2024 69     Calcium 2024 9.3     AST 2024 19     ALT 2024 28     Alkaline Phosphatase 2024 64     Total Protein 2024 7.0     Albumin 2024 4.4     Total Bilirubin 2024 0.66     eGFR 2024 101     WBC 2024 9.33     RBC 2024 5.41     Hemoglobin 2024 16.4     Hematocrit 2024 48.3     MCV 2024 89     MCH 2024 30.3     MCHC 2024 34.0     RDW 2024 12.5     MPV 2024 11.2     Platelets 2024 271     nRBC 2024 0     Segmented % 2024 63     Immature Grans % 2024 0     Lymphocytes % 2024 22     Monocytes % 2024 12     Eosinophils Relative 2024 2     Basophils Relative 2024 1     Absolute Neutrophils 2024 5.92     Absolute Immature Grans 2024 0.03     Absolute Lymphocytes 2024 2.07     Absolute Monocytes 2024 1.07     Eosinophils Absolute 2024 0.18     Basophils Absolute 2024 0.06     TSH 3RD GENERATON 2024 1.732      Imaging: EEG Sleep deprived  Result Date: 2024  Narrative: Table formatting from the original result was not included. ELECTROENCEPHALOGRAM Patient Name:  Mart Edward  MRN: [id number] :  1975 File #: [id number] Date performed: 24  Referring Provider: Veronica Martini MD      Report date: 2024      Study type: sleep deprived EEG ICD 10 diagnosis: Syncope R55 Patient History: EEG is requested to assess for seizures and/or classification of epilepsy. Patient is 49 y.o. male with occipital migraines who is being evaluated for a syncopal episode with convulsions. Current AEDs: none Medications include: none Description of Procedure:  The EEG was performed with electrodes applied using the International 10-20 System.  Additional electrodes used included EOG, ECG and T1/T2 electrodes.  A single lead ECG channel is also present.  At least 16 channels are reviewed at a time and formatted into longitudinal bipolar, transverse bipolar, and referential (to common reference or calculated common reference) montages.   The EEG was recorded with the patient awake, drowsy, and asleep.  The recording was technically satisfactory. EEG was recorded from 9:27 to 9:56. Findings: Background Activity: The background is symmetric with respect to voltages and activity.  During wakefulness, the background is well-organized with anterior very low amplitude beta activity and posterior low amplitude alpha activity.  There is a symmetric 10.5-11 Hz posterior dominant rhythm that attenuates with eye opening.  Drowsiness is characterized by roving eye movements, attenuation of the posterior dominant rhythm, central theta activity, positive occipital sharp transients of sleep (POSTS), and vertex waves. Stage 2 sleep is characterized by sleep spindles and K-complexes. Activation Procedures: Hyperventilation was not performed. Stepped photic stimulation from 1 to 30 fps was performed and produced symmetric photic driving response. Other findings: The single lead ECG shows a normal and sinus rhythm. Interpretation: This is a normal 29 minutes awake, drowsy, and asleep EEG. Nenita Silva MD Clinical Neurophysiology Fellow [facility] [place] Cody Dwyer MD PhD Attending Epileptologist [facility] [place]       Review of Systems:  Review of Systems negative except for HPI    Physical Exam:  Physical Exam  GEN: Alert and oriented x 3, in no acute distress.  Well appearing and well nourished.   HEENT: Sclera anicteric, conjunctivae pink, mucous membranes moist. Oropharynx clear.   NECK: Supple, no carotid  bruits, no significant JVD. Trachea midline, no thyromegaly.   HEART: Regular rhythm, normal S1 and S2, no murmurs, clicks, gallops or rubs. PMI nondisplaced, no thrills.   LUNGS: Clear to auscultation bilaterally; no wheezes, rales, or rhonchi. No increased work of breathing or signs of respiratory distress.   ABDOMEN: Soft, nontender, nondistended, normoactive bowel sounds.   EXTREMITIES: Skin warm and well perfused, no clubbing, cyanosis, or edema.  NEURO: No focal findings. Normal speech. Mood and affect normal.   SKIN: Normal without suspicious lesions on exposed skin.       1. Coronary artery disease involving coronary bypass graft of native heart without angina pectoris        2. Chronic obstructive pulmonary disease, unspecified COPD type (HCC)        3. Ischemic cardiomyopathy

## 2024-11-26 ENCOUNTER — VBI (OUTPATIENT)
Dept: ADMINISTRATIVE | Facility: OTHER | Age: 49
End: 2024-11-26

## 2024-11-26 NOTE — TELEPHONE ENCOUNTER
11/26/24 6:38 AM     Chart reviewed for CRC: Colonoscopy ; nothing is submitted to the patient's insurance at this time.     Anastasia Marks MA   [facility]

## 2024-11-29 DIAGNOSIS — I25.810 CORONARY ARTERY DISEASE INVOLVING CORONARY BYPASS GRAFT OF NATIVE HEART WITHOUT ANGINA PECTORIS: ICD-10-CM

## 2024-11-29 RX ORDER — LISINOPRIL 5 MG/1
5 TABLET ORAL DAILY
Qty: 30 TABLET | Refills: 5 | Status: SHIPPED | OUTPATIENT
Start: 2024-11-29

## 2024-12-03 ENCOUNTER — RESULTS FOLLOW-UP (OUTPATIENT)
Dept: CARDIOLOGY CLINIC | Facility: CLINIC | Age: 49
End: 2024-12-03

## 2024-12-03 ENCOUNTER — HOSPITAL ENCOUNTER (OUTPATIENT)
Dept: NON INVASIVE DIAGNOSTICS | Facility: CLINIC | Age: 49
Discharge: HOME | End: 2024-12-03
Payer: COMMERCIAL

## 2024-12-03 VITALS
BODY MASS INDEX: 36.83 KG/M2 | HEIGHT: 68 IN | HEART RATE: 71 BPM | WEIGHT: 243 LBS | SYSTOLIC BLOOD PRESSURE: 110 MMHG | DIASTOLIC BLOOD PRESSURE: 66 MMHG

## 2024-12-03 DIAGNOSIS — I25.5 ISCHEMIC CARDIOMYOPATHY: Primary | ICD-10-CM

## 2024-12-03 DIAGNOSIS — I25.5 ISCHEMIC CARDIOMYOPATHY: ICD-10-CM

## 2024-12-03 LAB
AORTIC ROOT: 3.3 CM
APICAL FOUR CHAMBER EJECTION FRACTION: 45 %
AV LVOT PEAK GRADIENT: 2 MMHG
AV PEAK GRADIENT: 4 MMHG
BSA FOR ECHO PROCEDURE: 2.22 M2
E WAVE DECELERATION TIME: 180 MS
E/A RATIO: 1.62
FRACTIONAL SHORTENING: 31 (ref 28–44)
INTERVENTRICULAR SEPTUM IN DIASTOLE (PARASTERNAL SHORT AXIS VIEW): 0.9 CM
INTERVENTRICULAR SEPTUM: 0.9 CM (ref 0.6–1.1)
LAAS-AP2: 17 CM2
LAAS-AP4: 19.1 CM2
LEFT ATRIUM AREA SYSTOLE SINGLE PLANE A4C: 17.7 CM2
LEFT ATRIUM SIZE: 3.6 CM
LEFT ATRIUM VOLUME (MOD BIPLANE): 55 ML
LEFT ATRIUM VOLUME INDEX (MOD BIPLANE): 24.8 ML/M2
LEFT INTERNAL DIMENSION IN SYSTOLE: 3.5 CM (ref 2.1–4)
LEFT VENTRICLE DIASTOLIC VOLUME (MOD BIPLANE): 177 ML
LEFT VENTRICLE DIASTOLIC VOLUME INDEX (MOD BIPLANE): 79.7 ML/M2
LEFT VENTRICLE SYSTOLIC VOLUME (MOD BIPLANE): 84 ML
LEFT VENTRICLE SYSTOLIC VOLUME INDEX (MOD BIPLANE): 37.8 ML/M2
LEFT VENTRICULAR INTERNAL DIMENSION IN DIASTOLE: 5.1 CM (ref 3.5–6)
LEFT VENTRICULAR POSTERIOR WALL IN END DIASTOLE: 1 CM
LEFT VENTRICULAR STROKE VOLUME: 71 ML
LV EF: 53 %
LVSV (TEICH): 71 ML
MV E'TISSUE VEL-LAT: 8 CM/S
MV E'TISSUE VEL-SEP: 7 CM/S
MV PEAK A VEL: 0.53 M/S
MV PEAK E VEL: 86 CM/S
MV STENOSIS PRESSURE HALF TIME: 52 MS
MV VALVE AREA P 1/2 METHOD: 4.23
RIGHT ATRIUM AREA SYSTOLE A4C: 16.6 CM2
RIGHT VENTRICLE ID DIMENSION: 4.1 CM
SL CV LEFT ATRIUM LENGTH A2C: 4.8 CM
SL CV LV EF: 35
SL CV PED ECHO LEFT VENTRICLE DIASTOLIC VOLUME (MOD BIPLANE) 2D: 124 ML
SL CV PED ECHO LEFT VENTRICLE SYSTOLIC VOLUME (MOD BIPLANE) 2D: 53 ML
TRICUSPID ANNULAR PLANE SYSTOLIC EXCURSION: 1.6 CM

## 2024-12-03 PROCEDURE — 93306 TTE W/DOPPLER COMPLETE: CPT | Performed by: INTERNAL MEDICINE

## 2024-12-03 PROCEDURE — 93306 TTE W/DOPPLER COMPLETE: CPT

## 2024-12-03 NOTE — TELEPHONE ENCOUNTER
Called pt and spoke to his wife, Sariah. Relayed 's message. She verbalized understanding. Provided central scheduling number and faxed req to testing downstairs.    ----- Message from Isaac Lozano MD sent at 12/3/2024  2:09 PM EST -----  Please call the patient and tell him the heart muscle is still weak.  Obtain a nuclear stress test to look for LV function and ischemia.  ----- Message -----  From: Isaac Lozano MD  Sent: 12/3/2024   2:08 PM EST  To: Isaac Lozano MD

## 2024-12-05 ENCOUNTER — OFFICE VISIT (OUTPATIENT)
Age: 49
End: 2024-12-05
Payer: COMMERCIAL

## 2024-12-05 VITALS
SYSTOLIC BLOOD PRESSURE: 130 MMHG | WEIGHT: 248 LBS | DIASTOLIC BLOOD PRESSURE: 70 MMHG | TEMPERATURE: 96.6 F | BODY MASS INDEX: 37.71 KG/M2

## 2024-12-05 DIAGNOSIS — D17.23 LIPOMA OF RIGHT LOWER EXTREMITY: Primary | ICD-10-CM

## 2024-12-05 DIAGNOSIS — D23.9 DERMATOFIBROMA: ICD-10-CM

## 2024-12-05 PROCEDURE — 99203 OFFICE O/P NEW LOW 30 MIN: CPT | Performed by: STUDENT IN AN ORGANIZED HEALTH CARE EDUCATION/TRAINING PROGRAM

## 2024-12-05 NOTE — PATIENT INSTRUCTIONS
"DERMATOFIBROMA    Physical Exam:  Anatomic Location Affected:  right upper back   Morphological Description:  tan dermal macule  Pertinent Positives:  Pertinent Negatives:    Additional History of Present Condition:  patient notes that only itches doesn't bother him in any other way    Assessment and Plan:  Based on a thorough discussion of this condition and the management approach to it (including a comprehensive discussion of the known risks, side effects and potential benefits of treatment), the patient (family) agrees to implement the following specific plan:  Reassured benign    Assessment and Plan:  A dermatofibroma is a common benign fibrous nodule that most often arises on the skin of the lower legs.  A dermatofibroma is also called a \"cutaneous fibrous histiocytoma.\"  Dermatofibromas occur at all ages and in people of every ethnicity. They are more common in women than in men.    It is not clear if dermatofibroma is a reactive process or if it is a neoplasm. The lesions are made up of proliferating fibroblasts. Histiocytes may also be involved.  They are sometimes attributed to an insect bite or ingrownhair or local trauma, but not consistently. They may be more numerous in patients with altered immunity.    Dermatofibromas most often occur on the legs and arms, but may also arise on the trunk or any site of the body.  Typical clinical features include the following:  People may have 1 or up to 15 lesions.  Size varies from 0.5-1.5 cm diameter; most lesions are 7-10 mm diameter.  They are firm nodules tethered to the skin surface and mobile over subcutaneous tissue.  The skin \"dimples\" on pinching the lesion.  Color may be pink to light brown in white skin, and dark brown to black in dark skin; some appear paler in the center.  They do not usually cause symptoms, but they are sometimes painful or itchy.  Because they are often raised lesions, they may be traumatized, for example by a razor.  Occasionally " dozens may erupt within a few months, usually in the setting of immunosuppression (for example autoimmune disease, cancer or certain medications).  Dermatofibroma does not give rise to cancer. However, occasionally, it may be mistaken for dermatofibrosarcoma or desmoplastic melanoma.    A dermatofibroma is harmless and seldom causes any symptoms. Usually, only reassurance is needed. If it is nuisance or causing concern, the lesion can be removed surgically, resulting in a scar that is, by definition, usually longer in diameter than the widest portion of the dermatofibroma.  Cryotherapy, shave biopsy and laser surgery are rarely completely successful.  Skin punch biopsy or incisional biopsy may be undertaken if there is an atypical feature such as recent enlargement, ulceration, or asymmetrical structures and colours on dermatoscopy.     LIPOMA    Physical Exam:  Anatomic Location Affected:  back of right thigh  Morphological Description:  soft mobile nodule  Pertinent Positives:  Pertinent Negatives:    Additional History of Present Condition:  patient notes that had about 2-3 years, doesn't bother patient    Assessment and Plan:  Based on a thorough discussion of this condition and the management approach to it (including a comprehensive discussion of the known risks, side effects and potential benefits of treatment), the patient (family) agrees to implement the following specific plan:  Can consider surgical removal if gets bothersome in future   Reassured benign    Lipoma  A lipoma is a non-cancerous tumor that is made up of fat cells. It slowly grows under the skin in the subcutaneous tissue. A person may have a single lipoma or may have many lipomas. They are very common.  Lipomas can occur in people of all ages, however, they tend to develop in adulthood and are most noticeable during middle age. They affect both sexes equally, although solitary lipomas are more common in women whilst multiple lipomas occur  more frequently in men.    The cause of lipomas is unknown. It is possible there may be genetic involvement as many patients with lipomas come from a family with a history of these tumors. Sometimes an injury such as a blunt blow to part of the body may trigger growth of a lipoma.  People are often unaware of lipomas until they have grown large enough to become visible and palpable. This growth occurs slowly over several years. Some features of lipomas include:  A dome-shaped or egg-shaped lump about 2-10 cm in diameter (some may grow even larger)   It feels soft and smooth and is easily moved under the skin with the fingers   Some have a rubbery or doughy consistency   They are most common on the shoulders, neck, trunk and arms, but they can occur anywhere on the body where fat tissue is present.    Most lipomas are symptomless, but some are painful on applying pressure. Lipomas that are tender or painful are usually angiolipomas. This means the lipoma has an increased number of small blood vessels. Painful lipomas are also a feature of adiposis dolorosa or Dercum disease.  Diagnosis of lipoma is usually made clinically by finding a soft lump under the skin. However, if there is any doubt, a deep skin biopsy can be performed which will show typical histopathological features of lipoma and its variants.    Most lipomas require no treatment. Most lipomas eventually stop growing and remain indefinitely without causing any problems. Occasionally, lipomas that interfere with the movement of adjacent muscles may require surgical removal. Several methods are available:  Simple surgical excision   Squeeze technique (a small incision is made over the lipoma and the fatty tissue is squeezed through the hole)   Liposuction

## 2024-12-05 NOTE — PROGRESS NOTES
"[facility]ermatology [facility] Note     Patient Name: Mart Edward  Encounter Date: 12/5/24     Have you been cared for by a [facility] Dermatologist in the last 3 years and, if so, which description applies to you?    NO.   I am considered a \"new\" patient and must complete all patient intake questions. I am MALE/not capable of bearing children.    REVIEW OF SYSTEMS:  Have you recently had or currently have any of the following? Recent fever or chills? No  Any non-healing wound? No   PAST MEDICAL HISTORY:  Have you personally ever had or currently have any of the following?  If \"YES,\" then please provide more detail. Skin cancer (such as Melanoma, Basal Cell Carcinoma, Squamous Cell Carcinoma?  No  Tuberculosis, HIV/AIDS, Hepatitis B or C: No  Radiation Treatment No   HISTORY OF IMMUNOSUPPRESSION:   Do you have a history of any of the following:  Systemic Immunosuppression such as Diabetes, Biologic or Immunotherapy, Chemotherapy, Organ Transplantation, Bone Marrow Transplantation or Prednisone?  No     Answering \"YES\" requires the addition of the dotphrase \"IMMUNOSUPPRESSED\" as the first diagnosis of the patient's visit.   FAMILY HISTORY:  Any \"first degree relatives\" (parent, brother, sister, or child) with the following?    Skin Cancer, Pancreatic or Other Cancer? No   PATIENT EXPERIENCE:    Do you want the Dermatologist to perform a COMPLETE skin exam today including a clinical examination under the \"bra and underwear\" areas?  NO  If necessary, do we have your permission to call and leave a detailed message on your Preferred Phone number that includes your specific medical information?  Yes      No Known Allergies   Current Outpatient Medications:     aspirin 81 mg chewable tablet, Chew 81 mg 2 (two) times a day, Disp: , Rfl:     hydrocortisone (CORTEF) 10 mg tablet, Two tablets in the morning, one in the afternoon and one at bed time. 08/23/24, Disp: , Rfl:     Lansoprazole (PREVACID PO), Take 20 mg by mouth " "daily, Disp: , Rfl:     lisinopril (ZESTRIL) 5 mg tablet, TAKE ONE TABLET BY MOUTH EVERY DAY, Disp: 30 tablet, Rfl: 5    rosuvastatin (CRESTOR) 40 MG tablet, Take 40 mg by mouth daily, Disp: , Rfl:     carvedilol (COREG) 6.25 mg tablet, Take 2 tablets (12.5 mg total) by mouth 2 (two) times a day with meals, Disp: 120 tablet, Rfl: 2    Current Facility-Administered Medications:     hydrocortisone (Solu-CORTEF) injection 100 mg, 100 mg, Intramuscular, Once PRN,           Whom besides the patient is providing clinical information about today's encounter?   NO ADDITIONAL HISTORIAN (patient alone provided history)    Physical Exam and Assessment/Plan by Diagnosis:    DERMATOFIBROMA    Physical Exam:  Anatomic Location Affected:  right upper back   Morphological Description:  tan dermal macule  Pertinent Positives:  Pertinent Negatives:    Additional History of Present Condition:  patient notes that only itches doesn't bother him in any other way    Assessment and Plan:  Based on a thorough discussion of this condition and the management approach to it (including a comprehensive discussion of the known risks, side effects and potential benefits of treatment), the patient (family) agrees to implement the following specific plan:  Reassured benign    Assessment and Plan:  A dermatofibroma is a common benign fibrous nodule that most often arises on the skin of the lower legs.  A dermatofibroma is also called a \"cutaneous fibrous histiocytoma.\"  Dermatofibromas occur at all ages and in people of every ethnicity. They are more common in women than in men.    It is not clear if dermatofibroma is a reactive process or if it is a neoplasm. The lesions are made up of proliferating fibroblasts. Histiocytes may also be involved.  They are sometimes attributed to an insect bite or ingrownhair or local trauma, but not consistently. They may be more numerous in patients with altered immunity.    Dermatofibromas most often occur on the " "legs and arms, but may also arise on the trunk or any site of the body.  Typical clinical features include the following:  People may have 1 or up to 15 lesions.  Size varies from 0.5-1.5 cm diameter; most lesions are 7-10 mm diameter.  They are firm nodules tethered to the skin surface and mobile over subcutaneous tissue.  The skin \"dimples\" on pinching the lesion.  Color may be pink to light brown in white skin, and dark brown to black in dark skin; some appear paler in the center.  They do not usually cause symptoms, but they are sometimes painful or itchy.  Because they are often raised lesions, they may be traumatized, for example by a razor.  Occasionally dozens may erupt within a few months, usually in the setting of immunosuppression (for example autoimmune disease, cancer or certain medications).  Dermatofibroma does not give rise to cancer. However, occasionally, it may be mistaken for dermatofibrosarcoma or desmoplastic melanoma.    A dermatofibroma is harmless and seldom causes any symptoms. Usually, only reassurance is needed. If it is nuisance or causing concern, the lesion can be removed surgically, resulting in a scar that is, by definition, usually longer in diameter than the widest portion of the dermatofibroma.  Cryotherapy, shave biopsy and laser surgery are rarely completely successful.  Skin punch biopsy or incisional biopsy may be undertaken if there is an atypical feature such as recent enlargement, ulceration, or asymmetrical structures and colours on dermatoscopy.     LIPOMA    Physical Exam:  Anatomic Location Affected:  back of right thigh  Morphological Description:  soft mobile nodule  Pertinent Positives:  Pertinent Negatives:    Additional History of Present Condition:  patient notes that had about 2-3 years, doesn't bother patient    Assessment and Plan:  Based on a thorough discussion of this condition and the management approach to it (including a comprehensive discussion of the " known risks, side effects and potential benefits of treatment), the patient (family) agrees to implement the following specific plan:  Can consider surgical removal if gets bothersome in future   Reassured benign    Lipoma  A lipoma is a non-cancerous tumor that is made up of fat cells. It slowly grows under the skin in the subcutaneous tissue. A person may have a single lipoma or may have many lipomas. They are very common.  Lipomas can occur in people of all ages, however, they tend to develop in adulthood and are most noticeable during middle age. They affect both sexes equally, although solitary lipomas are more common in women whilst multiple lipomas occur more frequently in men.    The cause of lipomas is unknown. It is possible there may be genetic involvement as many patients with lipomas come from a family with a history of these tumors. Sometimes an injury such as a blunt blow to part of the body may trigger growth of a lipoma.  People are often unaware of lipomas until they have grown large enough to become visible and palpable. This growth occurs slowly over several years. Some features of lipomas include:  A dome-shaped or egg-shaped lump about 2-10 cm in diameter (some may grow even larger)   It feels soft and smooth and is easily moved under the skin with the fingers   Some have a rubbery or doughy consistency   They are most common on the shoulders, neck, trunk and arms, but they can occur anywhere on the body where fat tissue is present.    Most lipomas are symptomless, but some are painful on applying pressure. Lipomas that are tender or painful are usually angiolipomas. This means the lipoma has an increased number of small blood vessels. Painful lipomas are also a feature of adiposis dolorosa or Dercum disease.  Diagnosis of lipoma is usually made clinically by finding a soft lump under the skin. However, if there is any doubt, a deep skin biopsy can be performed which will show typical  histopathological features of lipoma and its variants.    Most lipomas require no treatment. Most lipomas eventually stop growing and remain indefinitely without causing any problems. Occasionally, lipomas that interfere with the movement of adjacent muscles may require surgical removal. Several methods are available:  Simple surgical excision   Squeeze technique (a small incision is made over the lipoma and the fatty tissue is squeezed through the hole)   Liposuction    Scribe Attestation      I,:  Jaymie Duffy am acting as a scribe while in the presence of the attending physician.:       I,:  Félix Garcia DO personally performed the services described in this documentation    as scribed in my presence.:

## 2024-12-11 ENCOUNTER — HOSPITAL ENCOUNTER (OUTPATIENT)
Dept: NON INVASIVE DIAGNOSTICS | Facility: CLINIC | Age: 49
Discharge: HOME | End: 2024-12-11
Payer: COMMERCIAL

## 2024-12-11 VITALS
WEIGHT: 246 LBS | OXYGEN SATURATION: 96 % | BODY MASS INDEX: 37.28 KG/M2 | DIASTOLIC BLOOD PRESSURE: 86 MMHG | HEART RATE: 81 BPM | SYSTOLIC BLOOD PRESSURE: 134 MMHG | HEIGHT: 68 IN

## 2024-12-11 DIAGNOSIS — I25.5 ISCHEMIC CARDIOMYOPATHY: ICD-10-CM

## 2024-12-11 LAB
CHEST PAIN STATEMENT: NORMAL
MAX DIASTOLIC BP: 96 MMHG
MAX HR PERCENT: 88 %
MAX HR: 153 BPM
MAX PREDICTED HEART RATE: 172 BPM
NUC STRESS EJECTION FRACTION: 39 %
PROTOCOL NAME: NORMAL
RATE PRESSURE PRODUCT: NORMAL
SL CV REST NUCLEAR ISOTOPE DOSE: 10.97 MCI
SL CV STRESS NUCLEAR ISOTOPE DOSE: 30.7 MCI
SL CV STRESS RECOVERY BP: NORMAL MMHG
SL CV STRESS RECOVERY HR: 111 BPM
SL CV STRESS RECOVERY O2 SAT: 98 %
SL CV STRESS STAGE REACHED: 3
STRESS ANGINA INDEX: 0
STRESS BASELINE BP: NORMAL MMHG
STRESS BASELINE HR: 81 BPM
STRESS O2 SAT REST: 96 %
STRESS PEAK HR: 153 BPM
STRESS POST ESTIMATED WORKLOAD: 10.1 METS
STRESS POST EXERCISE DUR MIN: 9 MIN
STRESS POST EXERCISE DUR MIN: 9 MIN
STRESS POST EXERCISE DUR SEC: 1 SEC
STRESS POST EXERCISE DUR SEC: 1 SEC
STRESS POST O2 SAT PEAK: 93 %
STRESS POST PEAK BP: 212 MMHG
STRESS POST PEAK HR: 153 BPM
STRESS POST PEAK SYSTOLIC BP: 212 MMHG
TARGET HR FORMULA: NORMAL
TEST INDICATION: NORMAL

## 2024-12-11 PROCEDURE — 93018 CV STRESS TEST I&R ONLY: CPT | Performed by: INTERNAL MEDICINE

## 2024-12-11 PROCEDURE — 78452 HT MUSCLE IMAGE SPECT MULT: CPT | Performed by: INTERNAL MEDICINE

## 2024-12-11 PROCEDURE — 93017 CV STRESS TEST TRACING ONLY: CPT

## 2024-12-11 PROCEDURE — A9502 TC99M TETROFOSMIN: HCPCS

## 2024-12-11 PROCEDURE — 78452 HT MUSCLE IMAGE SPECT MULT: CPT

## 2024-12-11 PROCEDURE — 93016 CV STRESS TEST SUPVJ ONLY: CPT | Performed by: INTERNAL MEDICINE

## 2024-12-11 RX ORDER — REGADENOSON 0.08 MG/ML
0.4 INJECTION, SOLUTION INTRAVENOUS ONCE
Status: DISCONTINUED | OUTPATIENT
Start: 2024-12-11 | End: 2024-12-12 | Stop reason: HOSPADM

## 2024-12-12 ENCOUNTER — RESULTS FOLLOW-UP (OUTPATIENT)
Dept: CARDIOLOGY CLINIC | Facility: CLINIC | Age: 49
End: 2024-12-12

## 2024-12-16 NOTE — TELEPHONE ENCOUNTER
Patient Mart [phone] contacting office returning telephone call to discuss testing results.     Patient is working until 6:30 PM, if unable to reach patient, please leave detailed message and he will return call.

## 2024-12-16 NOTE — TELEPHONE ENCOUNTER
Spoke with patient, relayed providers message. He is good to proceed with the cardiac cath as recommended. If any information needs to be relayed, Mart states his wife Sariah (on contact sheet) can be contacted to discuss [phone].

## 2025-01-02 ENCOUNTER — TELEPHONE (OUTPATIENT)
Dept: CARDIOLOGY CLINIC | Facility: CLINIC | Age: 50
End: 2025-01-02

## 2025-01-07 ENCOUNTER — PREP FOR PROCEDURE (OUTPATIENT)
Dept: CARDIOLOGY CLINIC | Facility: CLINIC | Age: 50
End: 2025-01-07

## 2025-01-07 DIAGNOSIS — I25.810 CORONARY ARTERY DISEASE INVOLVING CORONARY BYPASS GRAFT OF NATIVE HEART WITHOUT ANGINA PECTORIS: ICD-10-CM

## 2025-01-07 DIAGNOSIS — I25.5 ISCHEMIC CARDIOMYOPATHY: Primary | ICD-10-CM

## 2025-01-09 ENCOUNTER — TELEMEDICINE (OUTPATIENT)
Dept: NEUROLOGY | Facility: CLINIC | Age: 50
End: 2025-01-09
Payer: COMMERCIAL

## 2025-01-09 ENCOUNTER — OFFICE VISIT (OUTPATIENT)
Age: 50
End: 2025-01-09
Payer: COMMERCIAL

## 2025-01-09 ENCOUNTER — APPOINTMENT (OUTPATIENT)
Age: 50
End: 2025-01-09
Payer: COMMERCIAL

## 2025-01-09 VITALS
SYSTOLIC BLOOD PRESSURE: 130 MMHG | BODY MASS INDEX: 38.34 KG/M2 | TEMPERATURE: 96.3 F | HEIGHT: 68 IN | RESPIRATION RATE: 14 BRPM | DIASTOLIC BLOOD PRESSURE: 70 MMHG | OXYGEN SATURATION: 94 % | HEART RATE: 78 BPM | WEIGHT: 253 LBS

## 2025-01-09 DIAGNOSIS — Z23 ENCOUNTER FOR IMMUNIZATION: ICD-10-CM

## 2025-01-09 DIAGNOSIS — R56.9 SEIZURE-LIKE ACTIVITY (HCC): ICD-10-CM

## 2025-01-09 DIAGNOSIS — I25.810 CORONARY ARTERY DISEASE INVOLVING CORONARY BYPASS GRAFT OF NATIVE HEART WITHOUT ANGINA PECTORIS: ICD-10-CM

## 2025-01-09 DIAGNOSIS — R56.9 SEIZURE-LIKE ACTIVITY (HCC): Primary | ICD-10-CM

## 2025-01-09 DIAGNOSIS — E27.1 ADDISON DISEASE (HCC): ICD-10-CM

## 2025-01-09 DIAGNOSIS — J44.9 CHRONIC OBSTRUCTIVE PULMONARY DISEASE, UNSPECIFIED COPD TYPE (HCC): ICD-10-CM

## 2025-01-09 DIAGNOSIS — K21.9 GASTROESOPHAGEAL REFLUX DISEASE WITHOUT ESOPHAGITIS: ICD-10-CM

## 2025-01-09 DIAGNOSIS — Z00.00 ANNUAL PHYSICAL EXAM: Primary | ICD-10-CM

## 2025-01-09 DIAGNOSIS — I25.5 ISCHEMIC CARDIOMYOPATHY: ICD-10-CM

## 2025-01-09 LAB
ANION GAP SERPL CALCULATED.3IONS-SCNC: 11 MMOL/L (ref 4–13)
BUN SERPL-MCNC: 10 MG/DL (ref 5–25)
CALCIUM SERPL-MCNC: 9.3 MG/DL (ref 8.4–10.2)
CHLORIDE SERPL-SCNC: 105 MMOL/L (ref 96–108)
CO2 SERPL-SCNC: 23 MMOL/L (ref 21–32)
CREAT SERPL-MCNC: 1.03 MG/DL (ref 0.6–1.3)
ERYTHROCYTE [DISTWIDTH] IN BLOOD BY AUTOMATED COUNT: 11.9 % (ref 11.6–15.1)
GFR SERPL CREATININE-BSD FRML MDRD: 85 ML/MIN/1.73SQ M
GLUCOSE P FAST SERPL-MCNC: 104 MG/DL (ref 65–99)
HCT VFR BLD AUTO: 51.5 % (ref 36.5–49.3)
HGB BLD-MCNC: 17 G/DL (ref 12–17)
INR PPP: 0.95 (ref 0.85–1.19)
MCH RBC QN AUTO: 30.5 PG (ref 26.8–34.3)
MCHC RBC AUTO-ENTMCNC: 33 G/DL (ref 31.4–37.4)
MCV RBC AUTO: 93 FL (ref 82–98)
PLATELET # BLD AUTO: 288 THOUSANDS/UL (ref 149–390)
PMV BLD AUTO: 11.2 FL (ref 8.9–12.7)
POTASSIUM SERPL-SCNC: 4 MMOL/L (ref 3.5–5.3)
PROTHROMBIN TIME: 13 SECONDS (ref 12.3–15)
RBC # BLD AUTO: 5.57 MILLION/UL (ref 3.88–5.62)
SODIUM SERPL-SCNC: 139 MMOL/L (ref 135–147)
WBC # BLD AUTO: 9.75 THOUSAND/UL (ref 4.31–10.16)

## 2025-01-09 PROCEDURE — 86337 INSULIN ANTIBODIES: CPT

## 2025-01-09 PROCEDURE — 99213 OFFICE O/P EST LOW 20 MIN: CPT | Performed by: STUDENT IN AN ORGANIZED HEALTH CARE EDUCATION/TRAINING PROGRAM

## 2025-01-09 PROCEDURE — 83519 RIA NONANTIBODY: CPT

## 2025-01-09 PROCEDURE — 82024 ASSAY OF ACTH: CPT

## 2025-01-09 PROCEDURE — 84244 ASSAY OF RENIN: CPT

## 2025-01-09 PROCEDURE — 82088 ASSAY OF ALDOSTERONE: CPT

## 2025-01-09 PROCEDURE — 86341 ISLET CELL ANTIBODY: CPT

## 2025-01-09 PROCEDURE — 85610 PROTHROMBIN TIME: CPT

## 2025-01-09 PROCEDURE — 90677 PCV20 VACCINE IM: CPT | Performed by: STUDENT IN AN ORGANIZED HEALTH CARE EDUCATION/TRAINING PROGRAM

## 2025-01-09 PROCEDURE — 85027 COMPLETE CBC AUTOMATED: CPT

## 2025-01-09 PROCEDURE — 99396 PREV VISIT EST AGE 40-64: CPT | Performed by: STUDENT IN AN ORGANIZED HEALTH CARE EDUCATION/TRAINING PROGRAM

## 2025-01-09 PROCEDURE — 90471 IMMUNIZATION ADMIN: CPT | Performed by: STUDENT IN AN ORGANIZED HEALTH CARE EDUCATION/TRAINING PROGRAM

## 2025-01-09 PROCEDURE — 80048 BASIC METABOLIC PNL TOTAL CA: CPT

## 2025-01-09 PROCEDURE — 36415 COLL VENOUS BLD VENIPUNCTURE: CPT

## 2025-01-09 NOTE — ASSESSMENT & PLAN NOTE
Following with Cardiology. Is getting [facility] on 1/20/25. Continue aspirin, statin.       
Following with Endocrinology. Continue cortef through them.       
Has only had one seizure in his life but was placed on Keppra. Recently established with Neurology who discontinued Keppra. Had EEG done that was negative. Continue to monitor off AEDs.       
On lansoprazole. Following with GI. Plan for EGD/colo once cleared from cardiac standpoint. Getting LHC done in 2 weeks.       
Patient denies need for inhalers at this time. Discussed smoking cessation. Patient has not smoked cigar in few months but not necessarily consider himself quit at this time. Denies need for smoking cessation aid. Will continue to monitor and encourage complete cessation if he continues to smoke.       
good minus

## 2025-01-09 NOTE — PATIENT INSTRUCTIONS
"Patient Education     Routine physical for adults   The Basics   Written by the doctors and editors at [facility]   What is a physical? -- A physical is a routine visit, or \"check-up,\" with your doctor. You might also hear it called a \"wellness visit\" or \"preventive visit.\"  During each visit, the doctor will:   Ask about your physical and mental health   Ask about your habits, behaviors, and lifestyle   Do an exam   Give you vaccines if needed   Talk to you about any medicines you take   Give advice about your health   Answer your questions  Getting regular check-ups is an important part of taking care of your health. It can help your doctor find and treat any problems you have. But it's also important for preventing health problems.  A routine physical is different from a \"sick visit.\" A sick visit is when you see a doctor because of a health concern or problem. Since physicals are scheduled ahead of time, you can think about what you want to ask the doctor.  How often should I get a physical? -- It depends on your age and health. In general, for people age 21 years and older:   If you are younger than 50 years, you might be able to get a physical every 3 years.   If you are 50 years or older, your doctor might recommend a physical every year.  If you have an ongoing health condition, like diabetes or high blood pressure, your doctor will probably want to see you more often.  What happens during a physical? -- In general, each visit will include:   Physical exam - The doctor or nurse will check your height, weight, heart rate, and blood pressure. They will also look at your eyes and ears. They will ask about how you are feeling and whether you have any symptoms that bother you.   Medicines - It's a good idea to bring a list of all the medicines you take to each doctor visit. Your doctor will talk to you about your medicines and answer any questions. Tell them if you are having any side effects that bother you. You " "should also tell them if you are having trouble paying for any of your medicines.   Habits and behaviors - This includes:   Your diet   Your exercise habits   Whether you smoke, drink alcohol, or use drugs   Whether you are sexually active   Whether you feel safe at home  Your doctor will talk to you about things you can do to improve your health and lower your risk of health problems. They will also offer help and support. For example, if you want to quit smoking, they can give you advice and might prescribe medicines. If you want to improve your diet or get more physical activity, they can help you with this, too.   Lab tests, if needed - The tests you get will depend on your age and situation. For example, your doctor might want to check your:   Cholesterol   Blood sugar   Iron level   Vaccines - The recommended vaccines will depend on your age, health, and what vaccines you already had. Vaccines are very important because they can prevent certain serious or deadly infections.   Discussion of screening - \"Screening\" means checking for diseases or other health problems before they cause symptoms. Your doctor can recommend screening based on your age, risk, and preferences. This might include tests to check for:   Cancer, such as breast, prostate, cervical, ovarian, colorectal, prostate, lung, or skin cancer   Sexually transmitted infections, such as chlamydia and gonorrhea   Mental health conditions like depression and anxiety  Your doctor will talk to you about the different types of screening tests. They can help you decide which screenings to have. They can also explain what the results might mean.   Answering questions - The physical is a good time to ask the doctor or nurse questions about your health. If needed, they can refer you to other doctors or specialists, too.  Adults older than 65 years often need other care, too. As you get older, your doctor will talk to you about:   How to prevent falling at " home   Hearing or vision tests   Memory testing   How to take your medicines safely   Making sure that you have the help and support you need at home  All topics are updated as new evidence becomes available and our peer review process is complete.  This topic retrieved from [employer] on: May 02, 2024.  Topic 046656 Version 1.0  Release: 32.4.3 - C32.122  © 2024 UpToDate, Inc. and/or its affiliates. All rights reserved.  Consumer Information Use and Disclaimer   Disclaimer: This generalized information is a limited summary of diagnosis, treatment, and/or medication information. It is not meant to be comprehensive and should be used as a tool to help the user understand and/or assess potential diagnostic and treatment options. It does NOT include all information about conditions, treatments, medications, side effects, or risks that may apply to a specific patient. It is not intended to be medical advice or a substitute for the medical advice, diagnosis, or treatment of a health care provider based on the health care provider's examination and assessment of a patient's specific and unique circumstances. Patients must speak with a health care provider for complete information about their health, medical questions, and treatment options, including any risks or benefits regarding use of medications. This information does not endorse any treatments or medications as safe, effective, or approved for treating a specific patient. UpToDate, Inc. and its affiliates disclaim any warranty or liability relating to this information or the use thereof.The use of this information is governed by the Terms of Use, available at https://www.woltersPoolCubesuwer.com/en/know/clinical-effectiveness-terms. 2024© UpToDate, Inc. and its affiliates and/or licensors. All rights reserved.  Copyright   © 2024 UpToDate, Inc. and/or its affiliates. All rights reserved.

## 2025-01-09 NOTE — PROGRESS NOTES
Virtual Follow-Up Visit  Name: Mart Edward       : 1975       MRN: [id number]   Encounter Provider: Veronica Martini MD   Encounter Date: 2025  Encounter department: [facility]    Mart Edward is a 49 y.o. male with PMH of CAD s/p CABG, [place]'s disease, and COPD who presents for follow-up of a one-time seizure-like event in  in the setting of COVID. He is not currently taking AEDs. Sleep-deprived routine EEG was normal. We discussed that he does not fulfil the diagnosis for epilepsy.       Assessment & Plan  Seizure-like activity (HCC)  - no AEDs  - no seizure precautions       RTC 1 year         VIRTUAL VISIT  Verification of patient location:  Patient is located at Home in the following state in which I hold an active license [place] :    The patient was identified by name and date of birth. The patient was informed that this is a telemedicine visit and that the visit is being conducted through the Epic Embedded platform. He agrees to proceed..  My office door was closed. No one else was in the room.  The patient acknowledged consent and understanding of privacy and security of the video platform. The patient has agreed to participate and understands they can discontinue the visit at any time.    Patient is aware this is a billable service.     INTERVAL HISTORY  He is doing well. He has had no further episodes of tunnel vision and lightheadedness and his headaches have not recurred off of Keppra.     Sleep deprived EEG was normal. He is wondering whether he still should have a diagnosis of Epilepsy.       Initial Seizure History:  He states he had his first and only seizure in 2021, at work. He remembers that his vision narrowed, he got lightheaded, lasting about a minute, and the next thing he knows he was waking up on the floor. The witness saw him foaming at the mouth and shaking, and he was unconscious for 7-10 minutes.      He does not remember feeling  sore, having had tongue bite or urinary incontinence, and he was not tired. In the hospital, they found that the had COVID. He was started on Keppra.      He stopped taking Keppra between Jan - June of this year due to loss of insurance. He restarted it in July but began to have severe headaches. He stopped taking Keppra, and his headaches resolved.     Epilepsy Risk Factors: Patient is a product of uncomplicated full term pregnancy, met appropriate development milestones. No learning disabilities or cognitive delay. No h/o febrile seizures, CNS infections, strokes, or CNS neoplasms.  There is no family h/o of seizures or epilepsy.     Prior Work-up:   MRI Brain (2/11/21), [facility]: Normal   REEGs (4/1/21), [facility]: Normal EEG      Past Medical History:    Past Medical History:   Diagnosis Date    Ryne disease (HCC)     COPD (chronic obstructive pulmonary disease) (HCC) 2002    COPD, mild (HCC)     Coronary artery disease     GERD (gastroesophageal reflux disease)     Heart disease     Myocardial infarction (HCC)     Pneumonia     Seizures (HCC)      Past Surgical History:   Procedure Laterality Date    CORONARY ARTERY BYPASS GRAFT  03/31/2014       Family History:  Family History   Problem Relation Age of Onset    Breast cancer Mother     Heart failure Mother     Cancer Mother     Heart disease Father     Diabetes type II Father     Dementia Father     Diabetes Father     Diabetes type II Brother     Diabetes Brother        Social History:  Social History     Tobacco Use    Smoking status: Some Days     Types: Cigars    Smokeless tobacco: Never    Tobacco comments:     cigars   Vaping Use    Vaping status: Never Used   Substance Use Topics    Alcohol use: Never    Drug use: Yes     Types: Marijuana          Allergies:  No Known Allergies    Medications:    Current Outpatient Medications:     aspirin 81 mg chewable tablet, Chew 81 mg 2 (two) times a day, Disp: , Rfl:     carvedilol (COREG) 6.25 mg tablet, Take  2 tablets (12.5 mg total) by mouth 2 (two) times a day with meals, Disp: 120 tablet, Rfl: 2    hydrocortisone (CORTEF) 10 mg tablet, Two tablets in the morning, one in the afternoon and one at bed time. 08/23/24, Disp: , Rfl:     Lansoprazole (PREVACID PO), Take 20 mg by mouth daily, Disp: , Rfl:     lisinopril (ZESTRIL) 5 mg tablet, TAKE ONE TABLET BY MOUTH EVERY DAY, Disp: 30 tablet, Rfl: 5    rosuvastatin (CRESTOR) 40 MG tablet, Take 40 mg by mouth daily, Disp: , Rfl:     Current Facility-Administered Medications:     hydrocortisone (Solu-CORTEF) injection 100 mg, 100 mg, Intramuscular, Once PRN,       Labs  I have reviewed pertinent labs:  CBC:   Lab Results   Component Value Date    WBC 9.33 09/26/2024    RBC 5.41 09/26/2024    HGB 16.4 09/26/2024    HCT 48.3 09/26/2024    MCV 89 09/26/2024     09/26/2024    MCH 30.3 09/26/2024    MCHC 34.0 09/26/2024    RDW 12.5 09/26/2024    MPV 11.2 09/26/2024    NEUTROABS 5.92 09/26/2024     CMP:   Lab Results   Component Value Date    SODIUM 137 09/26/2024    K 4.1 09/26/2024     09/26/2024    CO2 25 09/26/2024    AGAP 7 09/26/2024    BUN 13 09/26/2024    CREATININE 0.88 09/26/2024    GLUC 127 (H) 04/21/2023    GLUF 69 09/26/2024    CALCIUM 9.3 09/26/2024    AST 19 09/26/2024    ALT 28 09/26/2024    ALKPHOS 64 09/26/2024    TP 7.0 09/26/2024    ALB 4.4 09/26/2024    TBILI 0.66 09/26/2024    EGFR 101 09/26/2024       Lab Results   Component Value Date/Time    TSH 1.75 01/09/2021 09:25 AM    RDI7IJKTICFC 1.732 09/26/2024 09:29 AM    FREET4 NOT APPLICABLE 01/09/2021 09:25 AM    HGBA1C 5.7 (H) 09/26/2024 09:29 AM    HGBA1C 5.7 (H) 02/11/2021 05:19 AM    HIVAGABRESUL Non-Reactive 09/26/2024 09:29 AM          General Exam  GENERAL APPEARANCE:  No distress, alert, interactive and cooperative.  CARDIOVASCULAR: Warm and well perfused  LUNGS: normal work of breathing on room air  EXTREMITIES: no peripheral edema     Neurologic Exam  Mental Status: Alert and oriented to  person, place, and time.   Language: Fluent, comprehension intact.  Cranial Nerves: EOMI with no nystagmus. Face is symmetric. No dysarthria. Hearing is intact to conversation.    Motor:  Antigravity in all extremities.     Administrative Statements   The total amount of time spent with the patient and on chart review and documentation was 20 minutes. Issues addressed during this visit included counseling on diagnosis and prognosis.

## 2025-01-09 NOTE — PROGRESS NOTES
Adult Annual Physical  Name: Mart Edward      : 1975      MRN: [id number]  Encounter Provider: Nahum Donis MD  Encounter Date: 2025   Encounter department: [facility] [place]    Assessment & Plan  Annual physical exam  Immunizations: PCV20 given today; patient to get influenza vaccine at the pharmacy  Labs: No new lab orders; labs pending from this morning  Screening: Patient is due for CRC screening but he is also undergoing LHC on . He is established with GI already but will need to wait to get colonoscopy until after LHC and cardiac clearance        Coronary artery disease involving coronary bypass graft of native heart without angina pectoris  Following with Cardiology. Is getting LHC on 25. Continue aspirin, statin.       Chronic obstructive pulmonary disease, unspecified COPD type (HCC)  Patient denies need for inhalers at this time. Discussed smoking cessation. Patient has not smoked cigar in few months but not necessarily consider himself quit at this time. Denies need for smoking cessation aid. Will continue to monitor and encourage complete cessation if he continues to smoke.       [place] disease (HCC)  Following with Endocrinology. Continue cortef through them.       Seizure-like activity (HCC)  Has only had one seizure in his life but was placed on Keppra. Recently established with Neurology who discontinued Keppra. Had EEG done that was negative. Continue to monitor off AEDs.       Gastroesophageal reflux disease without esophagitis  On lansoprazole. Following with GI. Plan for EGD/colo once cleared from cardiac standpoint. Getting LHC done in 2 weeks.       Encounter for immunization    Orders:    Pneumococcal Conjugate Vaccine 20-valent (Pcv20)      Immunizations and preventive care screenings were discussed with patient today. Appropriate education was printed on patient's after visit summary.    Discussed risks and benefits of prostate cancer  screening. We discussed the controversial history of PSA screening for prostate cancer in the [place] as well as the risk of over detection and over treatment of prostate cancer by way of PSA screening.  The patient understands that PSA blood testing is an imperfect way to screen for prostate cancer and that elevated PSA levels in the blood may also be caused by infection, inflammation, prostatic trauma or manipulation, urological procedures, or by benign prostatic enlargement.    The role of the digital rectal examination in prostate cancer screening was also discussed and I discussed with him that there is large interobserver variability in the findings of digital rectal examination.    Counseling:  Alcohol/drug use: discussed moderation in alcohol intake, the recommendations for healthy alcohol use, and avoidance of illicit drug use.  Exercise: the importance of regular exercise/physical activity was discussed. Recommend exercise 3-5 times per week for at least 30 minutes.       Depression Screening and Follow-up Plan: Patient was screened for depression during today's encounter. They screened negative with a PHQ-2 score of 0.    Tobacco Cessation Counseling: Tobacco cessation counseling was provided. The patient is sincerely urged to quit consumption of tobacco. He is not ready to quit tobacco. Has not smoked cigars in months but not necessarily considering himself quit at this time        History of Present Illness     Adult Annual Physical:  Patient presents for annual physical.     Diet and Physical Activity:  - Diet/Nutrition:. Working on portion control  - Exercise:. Occupational activity -     Depression Screening:  - PHQ-2 Score: 0    General Health:  - Sleep: sleeps well and 7-8 hours of sleep on average.  - Hearing: normal hearing bilateral ears.  - Vision: goes for regular eye exams and wears glasses.  - Dental: no dental visits for > 1 year. Dentures     Health:  - History  of STDs: no.   - Urinary symptoms: none.     Advanced Care Planning:  - Has an advanced directive?: no    - Has a durable medical POA?: no    - ACP document given to patient?: no      Review of Systems   Constitutional:  Negative for appetite change, chills and fever.   HENT:  Negative for congestion and sore throat.    Eyes:  Negative for visual disturbance.   Respiratory:  Negative for cough and shortness of breath.    Cardiovascular:  Negative for chest pain and leg swelling.   Gastrointestinal:  Negative for abdominal pain, constipation, diarrhea and nausea.   Genitourinary:  Negative for difficulty urinating and dysuria.   Musculoskeletal:  Negative for arthralgias and myalgias.   Skin:  Positive for rash.        Rash in nares   Neurological:  Negative for dizziness, light-headedness and headaches.   Psychiatric/Behavioral:  Negative for confusion.      Medical History Reviewed by provider this encounter:  Tobacco  Allergies  Meds  Problems  Med Hx  Surg Hx  Fam Hx     .  Past Medical History   Past Medical History:   Diagnosis Date    [place] disease (HCC)     COPD (chronic obstructive pulmonary disease) (HCC) 2002    COPD, mild (HCC)     Coronary artery disease     GERD (gastroesophageal reflux disease)     Heart disease     Myocardial infarction (HCC)     Pneumonia     Seizures (HCC)      Past Surgical History:   Procedure Laterality Date    CORONARY ARTERY BYPASS GRAFT  03/31/2014     Family History   Problem Relation Age of Onset    Breast cancer Mother     Heart failure Mother     Cancer Mother     Heart disease Father     Diabetes type II Father     Dementia Father     Diabetes Father     Diabetes type II Brother     Diabetes Brother       reports that he has been smoking cigars. He has never used smokeless tobacco. He reports current drug use. Drug: Marijuana. He reports that he does not drink alcohol.  Current Outpatient Medications on File Prior to Visit   Medication Sig Dispense Refill     aspirin 81 mg chewable tablet Chew 81 mg 2 (two) times a day      carvedilol (COREG) 6.25 mg tablet Take 2 tablets (12.5 mg total) by mouth 2 (two) times a day with meals 120 tablet 2    hydrocortisone (CORTEF) 10 mg tablet Two tablets in the morning, one in the afternoon and one at bed time. 08/23/24      Lansoprazole (PREVACID PO) Take 20 mg by mouth daily      lisinopril (ZESTRIL) 5 mg tablet TAKE ONE TABLET BY MOUTH EVERY DAY 30 tablet 5    rosuvastatin (CRESTOR) 40 MG tablet Take 40 mg by mouth daily       Current Facility-Administered Medications on File Prior to Visit   Medication Dose Route Frequency Provider Last Rate Last Admin    hydrocortisone (Solu-CORTEF) injection 100 mg  100 mg Intramuscular Once PRN        No Known Allergies   Current Outpatient Medications on File Prior to Visit   Medication Sig Dispense Refill    aspirin 81 mg chewable tablet Chew 81 mg 2 (two) times a day      carvedilol (COREG) 6.25 mg tablet Take 2 tablets (12.5 mg total) by mouth 2 (two) times a day with meals 120 tablet 2    hydrocortisone (CORTEF) 10 mg tablet Two tablets in the morning, one in the afternoon and one at bed time. 08/23/24      Lansoprazole (PREVACID PO) Take 20 mg by mouth daily      lisinopril (ZESTRIL) 5 mg tablet TAKE ONE TABLET BY MOUTH EVERY DAY 30 tablet 5    rosuvastatin (CRESTOR) 40 MG tablet Take 40 mg by mouth daily       Current Facility-Administered Medications on File Prior to Visit   Medication Dose Route Frequency Provider Last Rate Last Admin    hydrocortisone (Solu-CORTEF) injection 100 mg  100 mg Intramuscular Once PRN           Social History     Tobacco Use    Smoking status: Some Days     Types: Cigars    Smokeless tobacco: Never    Tobacco comments:     cigars   Vaping Use    Vaping status: Never Used   Substance and Sexual Activity    Alcohol use: Never    Drug use: Yes     Types: Marijuana    Sexual activity: Yes     Partners: Female     Birth control/protection: None       Objective  "  /70 (BP Location: Left arm, Patient Position: Sitting, Cuff Size: Large)   Pulse 78   Temp (!) 96.3 °F (35.7 °C) (Tympanic)   Resp 14   Ht 5' 8\" (1.727 m)   Wt 115 kg (253 lb)   SpO2 94%   BMI 38.47 kg/m²     Physical Exam  Constitutional:       General: He is not in acute distress.  HENT:      Right Ear: Tympanic membrane, ear canal and external ear normal.      Left Ear: Tympanic membrane, ear canal and external ear normal.      Nose: Nose normal.      Mouth/Throat:      Mouth: Mucous membranes are moist.      Pharynx: Oropharynx is clear.   Eyes:      Conjunctiva/sclera: Conjunctivae normal.      Pupils: Pupils are equal, round, and reactive to light.   Neck:      Thyroid: No thyroid mass or thyroid tenderness.   Cardiovascular:      Rate and Rhythm: Normal rate and regular rhythm.      Heart sounds: Normal heart sounds.   Pulmonary:      Effort: Pulmonary effort is normal.      Breath sounds: Normal breath sounds.   Abdominal:      General: There is no distension.      Tenderness: There is no abdominal tenderness.   Musculoskeletal:      Cervical back: Neck supple.      Right lower leg: No edema.      Left lower leg: No edema.   Skin:     General: Skin is warm and dry.      Comments: Crusty skin at opening of nares   Neurological:      Mental Status: He is alert.      Sensory: Sensation is intact.      Motor: Motor function is intact.   Psychiatric:         Mood and Affect: Mood normal.         Speech: Speech normal.         Behavior: Behavior normal. Behavior is cooperative.         "

## 2025-01-10 ENCOUNTER — RESULTS FOLLOW-UP (OUTPATIENT)
Dept: ENDOCRINOLOGY | Facility: CLINIC | Age: 50
End: 2025-01-10

## 2025-01-10 LAB
THYROGLOB AB SERPL-ACNC: <1 IU/ML (ref 0–0.9)
THYROPEROXIDASE AB SERPL-ACNC: <9 IU/ML (ref 0–34)

## 2025-01-11 LAB
ACTH PLAS-MCNC: 16.1 PG/ML (ref 7.2–63.3)
GAD65 AB SER-ACNC: <5 U/ML (ref 0–5)

## 2025-01-16 LAB — ALDOST SERPL-MCNC: <1 NG/DL (ref 0–30)

## 2025-01-17 ENCOUNTER — TELEPHONE (OUTPATIENT)
Age: 50
End: 2025-01-17

## 2025-01-17 NOTE — TELEPHONE ENCOUNTER
Patient returned call to schedule an appointment.  He sees Dr. Thomas in the [place] Office.  Patient states you can call his work number [phone] and leave a message.  If you want to schedule he would appreciate a morning appointment and he will work around it.  I did not see an upcoming appointments with Dr. Thomas.

## 2025-01-17 NOTE — TELEPHONE ENCOUNTER
Returned patients call.    Unfortunately, there are no available appts at the [facility] location with .    Dr. Thomas will only be at the [facility] location until the end of February.    Did offer him an appt in [place] 2/10/25 @ 9:20 which is the earliest morning appt I see on Dr. Thomas's schedule.     I will keep an eye out for an appt cancellation with Dr. Thomas at the [facility] location.    Recommend he schedule in [place] if he wants to see .    [facility] does not have an opening in [facility] until April 25 @ 3:45 at this time.

## 2025-01-19 LAB — RENIN PLAS-CCNC: 9.66 NG/ML/HR (ref 0.17–5.38)

## 2025-01-20 ENCOUNTER — HOSPITAL ENCOUNTER (OUTPATIENT)
Facility: HOSPITAL | Age: 50
Setting detail: OUTPATIENT SURGERY
Discharge: HOME | End: 2025-01-20
Attending: INTERNAL MEDICINE | Admitting: INTERNAL MEDICINE
Payer: COMMERCIAL

## 2025-01-20 VITALS
BODY MASS INDEX: 37.72 KG/M2 | DIASTOLIC BLOOD PRESSURE: 67 MMHG | HEIGHT: 68 IN | OXYGEN SATURATION: 96 % | RESPIRATION RATE: 20 BRPM | HEART RATE: 76 BPM | TEMPERATURE: 98.6 F | WEIGHT: 248.9 LBS | SYSTOLIC BLOOD PRESSURE: 114 MMHG

## 2025-01-20 DIAGNOSIS — I25.5 ISCHEMIC CARDIOMYOPATHY: ICD-10-CM

## 2025-01-20 DIAGNOSIS — Z98.890 S/P CARDIAC CATHETERIZATION: Primary | ICD-10-CM

## 2025-01-20 DIAGNOSIS — I25.810 CORONARY ARTERY DISEASE INVOLVING CORONARY BYPASS GRAFT OF NATIVE HEART WITHOUT ANGINA PECTORIS: ICD-10-CM

## 2025-01-20 LAB
ATRIAL RATE: 66 BPM
INSULIN AB SER-ACNC: <5 UU/ML
P AXIS: 18 DEGREES
PR INTERVAL: 166 MS
QRS AXIS: 25 DEGREES
QRSD INTERVAL: 114 MS
QT INTERVAL: 392 MS
QTC INTERVAL: 410 MS
T WAVE AXIS: 82 DEGREES
VENTRICULAR RATE: 66 BPM

## 2025-01-20 PROCEDURE — C1769 GUIDE WIRE: HCPCS | Performed by: INTERNAL MEDICINE

## 2025-01-20 PROCEDURE — 76937 US GUIDE VASCULAR ACCESS: CPT | Performed by: INTERNAL MEDICINE

## 2025-01-20 PROCEDURE — 93459 L HRT ART/GRFT ANGIO: CPT | Performed by: INTERNAL MEDICINE

## 2025-01-20 PROCEDURE — 99152 MOD SED SAME PHYS/QHP 5/>YRS: CPT | Performed by: INTERNAL MEDICINE

## 2025-01-20 PROCEDURE — 93005 ELECTROCARDIOGRAM TRACING: CPT

## 2025-01-20 PROCEDURE — C1760 CLOSURE DEV, VASC: HCPCS | Performed by: INTERNAL MEDICINE

## 2025-01-20 PROCEDURE — 99153 MOD SED SAME PHYS/QHP EA: CPT | Performed by: INTERNAL MEDICINE

## 2025-01-20 PROCEDURE — 93010 ELECTROCARDIOGRAM REPORT: CPT | Performed by: STUDENT IN AN ORGANIZED HEALTH CARE EDUCATION/TRAINING PROGRAM

## 2025-01-20 PROCEDURE — C1894 INTRO/SHEATH, NON-LASER: HCPCS | Performed by: INTERNAL MEDICINE

## 2025-01-20 PROCEDURE — NC001 PR NO CHARGE

## 2025-01-20 DEVICE — ANGIO-SEAL VIP VASCULAR CLOSURE DEVICE
Type: IMPLANTABLE DEVICE | Site: ARTERIAL | Status: FUNCTIONAL
Brand: ANGIO-SEAL

## 2025-01-20 RX ORDER — SODIUM CHLORIDE 9 MG/ML
75 INJECTION, SOLUTION INTRAVENOUS CONTINUOUS
Status: DISCONTINUED | OUTPATIENT
Start: 2025-01-20 | End: 2025-01-20

## 2025-01-20 RX ORDER — LIDOCAINE WITH 8.4% SOD BICARB 0.9%(10ML)
SYRINGE (ML) INJECTION CODE/TRAUMA/SEDATION MEDICATION
Status: DISCONTINUED | OUTPATIENT
Start: 2025-01-20 | End: 2025-01-20 | Stop reason: HOSPADM

## 2025-01-20 RX ORDER — FENTANYL CITRATE 50 UG/ML
INJECTION, SOLUTION INTRAMUSCULAR; INTRAVENOUS CODE/TRAUMA/SEDATION MEDICATION
Status: DISCONTINUED | OUTPATIENT
Start: 2025-01-20 | End: 2025-01-20 | Stop reason: HOSPADM

## 2025-01-20 RX ORDER — MIDAZOLAM HYDROCHLORIDE 2 MG/2ML
INJECTION, SOLUTION INTRAMUSCULAR; INTRAVENOUS CODE/TRAUMA/SEDATION MEDICATION
Status: DISCONTINUED | OUTPATIENT
Start: 2025-01-20 | End: 2025-01-20 | Stop reason: HOSPADM

## 2025-01-20 RX ORDER — SPIRONOLACTONE 25 MG/1
25 TABLET ORAL DAILY
Qty: 30 TABLET | Refills: 2 | Status: SHIPPED | OUTPATIENT
Start: 2025-01-20

## 2025-01-20 RX ORDER — SODIUM CHLORIDE 9 MG/ML
75 INJECTION, SOLUTION INTRAVENOUS CONTINUOUS
Status: DISCONTINUED | OUTPATIENT
Start: 2025-01-20 | End: 2025-01-20 | Stop reason: HOSPADM

## 2025-01-20 NOTE — DISCHARGE INSTR - AVS FIRST PAGE
1. Please see the post cardiac catheterization dishcarge instructions.   No heavy lifting greater than 5 lbs for 1 week, no strenuous activity for 48 hrs.    2. Remove band aid tomorrow.  Shower and wash area- groin gently with soap and water- beginning tomorrow. Rinse and pat dry.  Apply new water seal band aid.  Repeat this process for 5 days. No powders, creams lotions or antibiotic ointments  for 5 days.  No tub baths, hot tubs or swimming for 5 days.     3. Please call our office ([phone]) if you have any fever, redness, swelling, discharge from your groin access site.    4.No driving for 2 days

## 2025-01-20 NOTE — H&P
History and Physical - Cardiology   Mart Edward 49 y.o. male MRN: [id number]  Unit/Bed#: [facility] C[facility] LAB ROOM Encounter: [id number]  01/20/25  8:33 AM          Assessment:  Abnormal stress test  CAD s/p CABG (SVG-OM1, SVG-OM2, radial-RCA) on 3/31/2014  ICM with EF 35%  COPD/ILD  Seizures  [place]'s disease on chronic hydrocortisone therapy  Hypertension  Hypothyroidism  Former tobacco use    Plan:  Discussed the indications, alternatives, risks and benefit of cardiac catheterization and possible PCI. The procedure risks, benefits, and complications (including but not limited to bleeding, infection, arrhythmia, nephrotoxicity, vessel injury, myocardial infarction, CVA, and death) were reviewed.  Patient is alert and oriented x3 and wishes to proceed. All questions answered.  Denies history of iodinated contrast allergy.      History of Present Illness       Principal Problem: Abnormal stress test    HPI: Mart Edward is a 49 y.o. year old male with history as per above who presents for outpatient cardiac catheterization.  Since time of last visit with cardiology patient has completed a pharmacological MPI stress test which revealed a large in size with moderate reduction partially reversible LV perfusion defect in the mid to basal, inferolateral and inferoseptal, apical inferior, and lateral, apical Segments.  Patient was arranged for cardiac catheterization for further ischemic evaluation.  Denies any additional complaints at this time.  All questions were answered.      Review of Systems:    Review of Systems   Constitutional:  Negative for chills and fever.   HENT:  Negative for ear pain and sore throat.    Eyes:  Negative for pain and visual disturbance.   Respiratory:  Negative for cough and shortness of breath.    Cardiovascular:  Negative for chest pain, palpitations and leg swelling.   Gastrointestinal:  Negative for abdominal pain and vomiting.   Genitourinary:  Negative for dysuria and hematuria.  "  Musculoskeletal:  Negative for arthralgias and back pain.   Skin:  Negative for color change and rash.   Neurological:  Negative for seizures and syncope.   All other systems reviewed and are negative.      Historical Information   Past Medical History:   Diagnosis Date    [place] disease (HCC)     COPD (chronic obstructive pulmonary disease) (HCC)     COPD, mild (HCC)     Coronary artery disease     GERD (gastroesophageal reflux disease)     Heart disease     Myocardial infarction (HCC)     Pneumonia     Seizures (HCC)      Past Surgical History:   Procedure Laterality Date    CORONARY ARTERY BYPASS GRAFT  2014     Social History     Substance and Sexual Activity   Alcohol Use Never     Social History     Substance and Sexual Activity   Drug Use Yes    Types: Marijuana     Social History     Tobacco Use   Smoking Status Some Days    Types: Cigars   Smokeless Tobacco Never   Tobacco Comments    cigars       Family History:   Family History   Problem Relation Age of Onset    Breast cancer Mother     Heart failure Mother     Cancer Mother     Heart disease Father     Diabetes type II Father     Dementia Father     Diabetes Father     Diabetes type II Brother     Diabetes Brother        Meds/Allergies   all current active meds have been reviewed  No Known Allergies    Objective   Vitals: Blood pressure 123/68, pulse 69, temperature (!) 97 °F (36.1 °C), temperature source Temporal, resp. rate 20, height 5' 8\" (1.727 m), weight 113 kg (248 lb 14.4 oz), SpO2 97%., Body mass index is 37.85 kg/m².,   Orthostatic Blood Pressures      Flowsheet Row Most Recent Value   Blood Pressure 123/68 filed at 2025 0748            Systolic (24hrs), Av , Min:123 , Max:123     Diastolic (24hrs), Av, Min:68, Max:68      No intake or output data in the 24 hours ending 25 0833    Invasive Devices       Peripheral Intravenous Line  Duration             Peripheral IV 25 Right;Ventral (anterior) Hand <1 day " "                       Physical Exam:  GEN: Alert and oriented x 3, in no acute distress.  Well appearing and well nourished.   HEENT: Sclera anicteric, conjunctivae pink, mucous membranes moist. Oropharynx clear.   NECK: Supple, no carotid bruits, no significant JVD. Trachea midline, no thyromegaly.   HEART: Regular rhythm, normal S1 and S2, no murmurs, clicks, gallops or rubs. PMI nondisplaced, no thrills.   LUNGS: Clear to auscultation bilaterally; no wheezes, rales, or rhonchi. No increased work of breathing or signs of respiratory distress.   ABDOMEN: Soft, nontender, nondistended, normoactive bowel sounds.   EXTREMITIES: Skin warm and well perfused, no clubbing, cyanosis, or edema.  NEURO: No focal findings. Normal speech. Mood and affect normal.   SKIN: Normal without suspicious lesions on exposed skin.     Lab Results:   Admission on 01/20/2025   Component Date Value    Ventricular Rate 01/20/2025 66     Atrial Rate 01/20/2025 66     [place] Interval 01/20/2025 166     QRSD Interval 01/20/2025 114     QT Interval 01/20/2025 392     QTC Interval 01/20/2025 410     P [place] 01/20/2025 18     QRS Axis 01/20/2025 25     T Wave Axis 01/20/2025 82              CBC with diff:       Invalid input(s): \"TOTALCELLSCOUNTED\", \"SEGS%\", \"GRANS%\", \"LYMPHS%\", \"EOS%\", \"BASO%\", \"ABNEUT\", \"ABGRANS\", \"ABLYMPHS\", \"ABMOMOS\", \"ABEOS\", \"ABBASO\"      CMP:      Invalid input(s): \"ALBUMIN\"          "

## 2025-01-20 NOTE — Clinical Note
The left coronary artery was selectively engaged, injected and visualized. Multiple views of the injected vessel were taken. LAD

## 2025-01-20 NOTE — Clinical Note
No issues today.  Tolerating statin.  Continuing present management plan.  Labs due.   The left coronary artery was selectively engaged, injected and visualized. Multiple views of the injected vessel were taken. circ

## 2025-01-20 NOTE — Clinical Note
The radial graft was selectively engaged, injected and visualized. Multiple views of the injected vessel were taken. RCA

## 2025-01-21 LAB — ISLET CELL512 AB SER-ACNC: <7.5 U/ML

## 2025-01-22 ENCOUNTER — TELEPHONE (OUTPATIENT)
Age: 50
End: 2025-01-22

## 2025-01-22 DIAGNOSIS — E27.1 ADDISON DISEASE (HCC): Primary | ICD-10-CM

## 2025-01-22 NOTE — TELEPHONE ENCOUNTER
Received call from pt's spouse, Sariah, stating pt has some reservations about starting the newly prescribed spironolactone. She stated he has [place]al[place]'s disease and wants to make sure taking the spironolactone will not affect this. Advised Sariah will send a message to the provider to review and advise.     Pt is at work until 5:30pm today. He can be reached back at his work number of [phone]. If no answer, can call Sariah back at [phone]. Thanks!

## 2025-01-23 RX ORDER — HYDROCORTISONE 10 MG/1
TABLET ORAL
Start: 2025-01-23

## 2025-01-23 NOTE — TELEPHONE ENCOUNTER
Pt returning call in regard to this question regarding the spironolactone that was prescribed upon discharge from his CATH on 1/20. Pt has concerns about taking this due to his sodium levels dropping at times due to his [place]'s disease.    Advised patient provider would be informed and would call back with recommendation.

## 2025-01-23 NOTE — TELEPHONE ENCOUNTER
Patient returning call and made aware:    Nanette Thomas MD to [facility] Regarding results: 3       1/16/25 11:29 PM  Result Note  Aldosterone is low in favor of primary adrenal insufficiency, in setting of [place]'s disease, awaiting for rest of the results. He will need a follow up appointment to discuss.    Patient verbalized understanding. States he needs to check his schedule and will call back for an appt. States PCP was ordering hydrocortisone but asking if Dr. Thomas can do this. Thank you

## 2025-02-04 ENCOUNTER — APPOINTMENT (OUTPATIENT)
Dept: LAB | Facility: CLINIC | Age: 50
End: 2025-02-04
Payer: COMMERCIAL

## 2025-02-04 DIAGNOSIS — E27.1 ADDISON DISEASE (HCC): ICD-10-CM

## 2025-02-04 LAB
ANION GAP SERPL CALCULATED.3IONS-SCNC: 8 MMOL/L (ref 4–13)
BUN SERPL-MCNC: 11 MG/DL (ref 5–25)
CALCIUM SERPL-MCNC: 9.8 MG/DL (ref 8.4–10.2)
CHLORIDE SERPL-SCNC: 103 MMOL/L (ref 96–108)
CO2 SERPL-SCNC: 29 MMOL/L (ref 21–32)
CREAT SERPL-MCNC: 1.17 MG/DL (ref 0.6–1.3)
GFR SERPL CREATININE-BSD FRML MDRD: 73 ML/MIN/1.73SQ M
GLUCOSE P FAST SERPL-MCNC: 89 MG/DL (ref 65–99)
POTASSIUM SERPL-SCNC: 4.4 MMOL/L (ref 3.5–5.3)
SODIUM SERPL-SCNC: 140 MMOL/L (ref 135–147)

## 2025-02-04 PROCEDURE — 36415 COLL VENOUS BLD VENIPUNCTURE: CPT

## 2025-02-04 PROCEDURE — 80048 BASIC METABOLIC PNL TOTAL CA: CPT

## 2025-02-09 DIAGNOSIS — I25.810 CORONARY ARTERY DISEASE INVOLVING CORONARY BYPASS GRAFT OF NATIVE HEART WITHOUT ANGINA PECTORIS: ICD-10-CM

## 2025-02-11 RX ORDER — CARVEDILOL 6.25 MG/1
12.5 TABLET ORAL 2 TIMES DAILY WITH MEALS
Qty: 120 TABLET | Refills: 5 | Status: SHIPPED | OUTPATIENT
Start: 2025-02-11

## 2025-03-05 DIAGNOSIS — I25.5 ISCHEMIC CARDIOMYOPATHY: ICD-10-CM

## 2025-03-06 RX ORDER — SPIRONOLACTONE 25 MG/1
25 TABLET ORAL DAILY
Qty: 30 TABLET | Refills: 5 | Status: SHIPPED | OUTPATIENT
Start: 2025-03-06

## 2025-05-29 ENCOUNTER — OFFICE VISIT (OUTPATIENT)
Dept: CARDIOLOGY CLINIC | Facility: CLINIC | Age: 50
End: 2025-05-29
Payer: COMMERCIAL

## 2025-05-29 VITALS
RESPIRATION RATE: 16 BRPM | BODY MASS INDEX: 38.19 KG/M2 | SYSTOLIC BLOOD PRESSURE: 122 MMHG | OXYGEN SATURATION: 98 % | DIASTOLIC BLOOD PRESSURE: 84 MMHG | WEIGHT: 252 LBS | HEART RATE: 76 BPM | HEIGHT: 68 IN

## 2025-05-29 DIAGNOSIS — I25.810 CORONARY ARTERY DISEASE INVOLVING CORONARY BYPASS GRAFT OF NATIVE HEART WITHOUT ANGINA PECTORIS: Primary | ICD-10-CM

## 2025-05-29 DIAGNOSIS — I25.5 ISCHEMIC CARDIOMYOPATHY: ICD-10-CM

## 2025-05-29 PROCEDURE — 99214 OFFICE O/P EST MOD 30 MIN: CPT | Performed by: INTERNAL MEDICINE

## 2025-05-29 RX ORDER — SPIRONOLACTONE 25 MG/1
25 TABLET ORAL DAILY
Qty: 30 TABLET | Refills: 5 | Status: SHIPPED | OUTPATIENT
Start: 2025-05-29

## 2025-05-29 RX ORDER — ROSUVASTATIN CALCIUM 40 MG/1
40 TABLET, COATED ORAL DAILY
Qty: 90 TABLET | Refills: 3 | Status: SHIPPED | OUTPATIENT
Start: 2025-05-29

## 2025-05-29 RX ORDER — CARVEDILOL 6.25 MG/1
12.5 TABLET ORAL 2 TIMES DAILY WITH MEALS
Qty: 120 TABLET | Refills: 5 | Status: SHIPPED | OUTPATIENT
Start: 2025-05-29

## 2025-05-29 RX ORDER — LISINOPRIL 5 MG/1
5 TABLET ORAL DAILY
Qty: 30 TABLET | Refills: 5 | Status: SHIPPED | OUTPATIENT
Start: 2025-05-29

## 2025-05-29 NOTE — PROGRESS NOTES
Cardiology Consultation     Mart Edward  [id number]  1975  [facility]  [facility] [place] [address]    Impression:  Inferior infarct/CAD s/p CABG (SVG to OM1 and OM2 and radial to RCA) on 3/31/2014 with patent grafts on cath in 2/2017  Abnormal stress test  Ischemic cardiomyopathy with improved LVEF of 45-49%, latest EF 39% on nuclear stress test.  Abnormal nuclear stress test leading to cardiac cath 1/20/2025  COPD/ILD   Seizures  [place]'s disease on chronic hydrocortisone therapy  Hypertension  Hypothyroidism   Tobacco use with cessation in 2005  History COVID 19 in 2/2021 with seizure    Plan:  He is doing well, asymptomatic from a cardiovascular standpoint.  We reviewed his nuclear stress test and cardiac cath results.   He states he has been compliant with his medications.  HE is new to spironalactone.   His EF previously dropped to 35% when he was off GDMT due to INS reasons.  Given cardiomyopathy check echo now, 5 months of GDMT.   Continue the current medications including aspirin, carvedilol, lisinopril and rosuvastatin.  Last LDL 88 was in 9/2024. Increase rosuvastatin      Mart Edward is a 48 year old who presents for follow up of CAD and Ischemic Cardiomyopathy, transfered his care from [facility]. He feels well with no cp, sob, sharif, swelling, syncope. He doesn't exercise much. HE was off meds due to ins for a year when the ef of 35% was noted. He had one seizure while sick with COVID.    At last office visit he was symptomatic which led to an abnormal nuclear stress test which led to the cardiac catheterization listed below.  He now feels well with no current complaints    Cardiac catheterization 1/20/2025:    Mid Cx lesion is 95% stenosed.    Mid Cx to Dist Cx lesion is 100% stenosed, .    Prox RCA lesion is 100% stenosed, .    Separate os of the LAD and circumflex from the aorta.     Widely patent native LAD.    Widely patent SVG to OM1 sequential to OM 2.    Widely patent free radial to RPDA.    Widely patent LIMA which leads to the chest wall, not used for a bypass as LAD is widely patent.  Recommendation Continue aggressive GDMT for ischemic cardiomyopathy.  EF on echo was 35% and on nuclear stress test was 39%.  No need for LifeVest at this time.  Repeat echocardiogram after 3 months of max tolerated GDMT to assess LV function and possible need for ICD if EF remains low.            Previous outside cardiac testing:  Echocardiogram [facility] 11/7/2023: Ejection fraction 35%.    Echo, 5/4/2022  The qualitative LV ejection fraction is 45-49% (mildly reduced).  The mid to basal inferior and inferolateral segments are bright and severely hypokinetic suggestive of scar. Other walls contract  grossly normally.  The left ventricular diastolic function is normal.  The right ventricular systolic function is normal as assessed by tricuspid annular plane systolic excursion (TAPSE) (normal >1.7 cm).  The tricuspid regurgitation spectral Doppler examination is inadequate to calculate the right ventricular systolic pressure.    Echo, 2/10/21  The examination is technically difficult. Definity was used for contrast enhancement of the myocardium.  The qualitative LV ejection fraction is 40-44% (mildly reduced).  The left ventricular cavity size is mildly enlarged.  The mid and basal inferior/inferolateral walls are akinetic and bright suggestive of a scar  The left ventricular diastolic function is normal.  No significant valvular abnormalities.  Trivial tricuspid regurgitation is present. The signal is inadequate to calculate pulmo  nary artery systolic pressure.  No pericardial effusion is noted.    12 day Zio, 3/4/21: Benign event monitor with no reported symptoms. Clinical correlation advised. Please confer above for complete details.          Patient Active Problem List   Diagnosis    COPD (chronic  obstructive pulmonary disease) (HCC)    Coronary artery disease involving coronary bypass graft of native heart    GERD (gastroesophageal reflux disease)    Preston disease (HCC)    Seizure-like activity (HCC)     Past Medical History:   Diagnosis Date    Preston disease (HCC)     COPD (chronic obstructive pulmonary disease) (HCC) 2002    COPD, mild (HCC)     Coronary artery disease     GERD (gastroesophageal reflux disease)     Heart disease     Myocardial infarction (HCC)     Pneumonia     Seizures (HCC)      Social History     Socioeconomic History    Marital status: /Civil Union     Spouse name: Not on file    Number of children: Not on file    Years of education: Not on file    Highest education level: Not on file   Occupational History    Not on file   Tobacco Use    Smoking status: Some Days     Types: Cigars    Smokeless tobacco: Never    Tobacco comments:     cigars   Vaping Use    Vaping status: Never Used   Substance and Sexual Activity    Alcohol use: Never    Drug use: Yes     Types: Marijuana    Sexual activity: Yes     Partners: Female     Birth control/protection: None   Other Topics Concern    Not on file   Social History Narrative    Not on file     Social Drivers of Health     Financial Resource Strain: Not on file   Food Insecurity: Not on file   Transportation Needs: Not on file   Physical Activity: Sufficiently Active (1/9/2025)    Exercise Vital Sign     Days of Exercise per Week: 5 days     Minutes of Exercise per Session: 60 min   Stress: Not on file   Social Connections: Not on file   Intimate Partner Violence: Not on file   Housing Stability: Not on file      Family History   Problem Relation Name Age of Onset    Breast cancer Mother Rosanna     Heart failure Mother Rosanna     Cancer Mother Rosanna     Heart disease Father Evan     Diabetes type II Father Evan     Dementia Father Evan     Diabetes Father Evan     Diabetes type II Brother Evan     Diabetes Brother Evan       Past Surgical History:   Procedure Laterality Date    CARDIAC CATHETERIZATION Left 1/20/2025    Procedure: Cardiac Left Heart Cath;  Surgeon: Isaac Lozano MD;  Location: [facility];  Service: Cardiology    CORONARY ARTERY BYPASS GRAFT  03/31/2014       Current Outpatient Medications:     aspirin 81 mg chewable tablet, Chew 81 mg 2 (two) times a day, Disp: , Rfl:     carvedilol (COREG) 6.25 mg tablet, TAKE TWO TABLETS BY MOUTH TWICE A DAY WITH MEALS, Disp: 120 tablet, Rfl: 5    hydrocortisone (CORTEF) 10 mg tablet, Two tablets in the morning, one in the afternoon and one at bed time. 08/23/24, Disp: , Rfl:     Lansoprazole (PREVACID PO), Take 20 mg by mouth daily, Disp: , Rfl:     lisinopril (ZESTRIL) 5 mg tablet, TAKE ONE TABLET BY MOUTH EVERY DAY, Disp: 30 tablet, Rfl: 5    rosuvastatin (CRESTOR) 40 MG tablet, Take 40 mg by mouth daily, Disp: , Rfl:     spironolactone (ALDACTONE) 25 mg tablet, Take 1 tablet (25 mg total) by mouth daily, Disp: 30 tablet, Rfl: 5    Current Facility-Administered Medications:     hydrocortisone (Solu-CORTEF) injection 100 mg, 100 mg, Intramuscular, Once PRN,   No Known Allergies  There were no vitals filed for this visit.      Labs:  Appointment on 02/04/2025   Component Date Value    Sodium 02/04/2025 140     Potassium 02/04/2025 4.4     Chloride 02/04/2025 103     CO2 02/04/2025 29     ANION GAP 02/04/2025 8     BUN 02/04/2025 11     Creatinine 02/04/2025 1.17     Glucose, Fasting 02/04/2025 89     Calcium 02/04/2025 9.8     eGFR 02/04/2025 73    Admission on 01/20/2025, Discharged on 01/20/2025   Component Date Value    Ventricular Rate 01/20/2025 66     Atrial Rate 01/20/2025 66     FL Interval 01/20/2025 166     QRSD Interval 01/20/2025 114     QT Interval 01/20/2025 392     QTC Interval 01/20/2025 410     P [place] 01/20/2025 18     QRS Axis 01/20/2025 25     T Wave [place] 01/20/2025 82    Appointment on 01/09/2025   Component Date Value    Aldosterone 01/09/2025  <1.0     Renin 01/09/2025 9.661 (H)     ACTH 01/09/2025 16.1     Sodium 01/09/2025 139     Potassium 01/09/2025 4.0     Chloride 01/09/2025 105     CO2 01/09/2025 23     ANION GAP 01/09/2025 11     BUN 01/09/2025 10     Creatinine 01/09/2025 1.03     Glucose, Fasting 01/09/2025 104 (H)     Calcium 01/09/2025 9.3     eGFR 01/09/2025 85     WBC 01/09/2025 9.75     RBC 01/09/2025 5.57     Hemoglobin 01/09/2025 17.0     Hematocrit 01/09/2025 51.5 (H)     MCV 01/09/2025 93     MCH 01/09/2025 30.5     MCHC 01/09/2025 33.0     RDW 01/09/2025 11.9     Platelets 01/09/2025 288     MPV 01/09/2025 11.2     Protime 01/09/2025 13.0     INR 01/09/2025 0.95     Glutaminc Acid Decarboxy* 01/09/2025 <5.0     IA-2 Autoantibodies 01/09/2025 <7.5     Insulin AutoAb 01/09/2025 <5.0    Hospital Outpatient Visit on 12/11/2024   Component Date Value    Baseline HR 12/11/2024 81     Baseline BP 12/11/2024 134/86     O2 sat rest 12/11/2024 96     Stress peak HR 12/11/2024 153     Post peak BP 12/11/2024 212     O2 sat peak 12/11/2024 93     Recovery HR 12/11/2024 111     Recovery BP 12/11/2024 192/96     O2 sat recovery 12/11/2024 98     Max HR 12/11/2024 153     Max HR Percent 12/11/2024 88     Exercise duration (min) 12/11/2024 9     Exercise duration (sec) 12/11/2024 1     Estimated workload 12/11/2024 10.1     Rate Pressure Product 12/11/2024 32,436.0     Angina Index 12/11/2024 0     Stress Stage Reached 12/11/2024 3.0     Rest Nuclear Isotope Dose 12/11/2024 10.97     Stress Nuclear Isotope D* 12/11/2024 30.70     EF (%) 12/11/2024 39     Protocol Name 12/11/2024 JOHN PAUL     Exercise duration (min) 12/11/2024 9     Exercise duration (sec) 12/11/2024 1     Post Peak Systolic BP 12/11/2024 212     Max Diastolic Bp 12/11/2024 96     Peak HR 12/11/2024 153     Max Predicted Heart Rate 12/11/2024 172     Reason for Termination 12/11/2024                      Value:Target Heart Rate Achieved  Maximal exercise threshold      Test Indication  2024 ICM     Target Hr Formular 2024 (220 - Age)*85%     Chest Pain Statement 2024 none    Hospital Outpatient Visit on 2024   Component Date Value    RAA A4C 2024 16.6     ROSA A4C 2024 17.7     LA Volume Index (BP) 2024 24.8     LV Diastolic Volume (BP) 2024 177     LV Systolic Volume (BP) 2024 84     MV Peak A Magan 2024 0.53     MV stenosis pressure 1/2* 2024 52     MV Peak E Magan 2024 86     AV peak gradient 2024 4     E wave deceleration time 2024 180     E/A ratio 2024 1.62     MV valve area p 1/2 meth* 2024 4.23     AV LVOT peak gradient 2024 2     RVID d 2024 4.1     A4C EF 2024 45     Left ventricular stroke * 2024 71.00     IVSd 2024 0.90     Tricuspid annular plane * 2024 1.60     Ao root 2024 3.30     LVPWd 2024 1.00     LA size 2024 3.6     LA volume (BP) 2024 55     EF 2024 53     FS 2024 31     LVIDS 2024 3.50     IVS 2024 0.9     LVIDd 2024 5.10     LA length (A2C) 2024 4.80     LEFT VENTRICLE SYSTOLIC * 2024 53     LV DIASTOLIC VOLUME (MOD* 2024 124     Left Atrium Area-systoli* 2024 19.1     Left Atrium Area-systoli* 2024 17     MV E' Tissue Velocity La* 2024 8     MV E' Tissue Velocity Se* 2024 7     LVSV, 2D 2024 71     BSA 2024 2.22     LV Diastolic Volume Inde* 2024 79.7     LV Systolic Volume Index* 2024 37.8     LV EF 2024 35      Imaging: EEG Sleep deprived  Result Date: 2024  Narrative: Table formatting from the original result was not included. ELECTROENCEPHALOGRAM Patient Name:  Mart Edward  MRN: [id number] :  1975 File #: [id number] Date performed: 24  Referring Provider: Veronica Martini MD      Report date: 2024      Study type: sleep deprived EEG ICD 10 diagnosis: Syncope R55 Patient History: EEG is requested to  assess for seizures and/or classification of epilepsy. Patient is 49 y.o. male with occipital migraines who is being evaluated for a syncopal episode with convulsions. Current AEDs: none Medications include: none Description of Procedure: The EEG was performed with electrodes applied using the International 10-20 System.  Additional electrodes used included EOG, ECG and T1/T2 electrodes.  A single lead ECG channel is also present.  At least 16 channels are reviewed at a time and formatted into longitudinal bipolar, transverse bipolar, and referential (to common reference or calculated common reference) montages.   The EEG was recorded with the patient awake, drowsy, and asleep.  The recording was technically satisfactory. EEG was recorded from 9:27 to 9:56. Findings: Background Activity: The background is symmetric with respect to voltages and activity.  During wakefulness, the background is well-organized with anterior very low amplitude beta activity and posterior low amplitude alpha activity.  There is a symmetric 10.5-11 Hz posterior dominant rhythm that attenuates with eye opening.  Drowsiness is characterized by roving eye movements, attenuation of the posterior dominant rhythm, central theta activity, positive occipital sharp transients of sleep (POSTS), and vertex waves. Stage 2 sleep is characterized by sleep spindles and K-complexes. Activation Procedures: Hyperventilation was not performed. Stepped photic stimulation from 1 to 30 fps was performed and produced symmetric photic driving response. Other findings: The single lead ECG shows a normal and sinus rhythm. Interpretation: This is a normal 29 minutes awake, drowsy, and asleep EEG. Nenita Silva MD Clinical Neurophysiology Fellow [facility] Cody Dwyer MD PhD Attending Epileptologist [facility]       Review of Systems:  Review of Systems negative except for  HPI    Physical Exam:  Physical Exam  GEN: Alert and oriented x 3, in no acute distress.  Well appearing and well nourished.   HEENT: Sclera anicteric, conjunctivae pink, mucous membranes moist. Oropharynx clear.   NECK: Supple, no carotid bruits, no significant JVD. Trachea midline, no thyromegaly.   HEART: Regular rhythm, normal S1 and S2, no murmurs, clicks, gallops or rubs. PMI nondisplaced, no thrills.   LUNGS: Clear to auscultation bilaterally; no wheezes, rales, or rhonchi. No increased work of breathing or signs of respiratory distress.   ABDOMEN: Soft, nontender, nondistended, normoactive bowel sounds.   EXTREMITIES: Skin warm and well perfused, no clubbing, cyanosis, or edema.  NEURO: No focal findings. Normal speech. Mood and affect normal.   SKIN: Normal without suspicious lesions on exposed skin.       1. Coronary artery disease involving coronary bypass graft of native heart without angina pectoris

## 2025-06-25 ENCOUNTER — RA CDI HCC (OUTPATIENT)
Dept: OTHER | Facility: HOSPITAL | Age: 50
End: 2025-06-25

## 2025-06-25 NOTE — PROGRESS NOTES
HCC coding opportunities       Chart reviewed, no opportunity found: CHART REVIEWED, NO OPPORTUNITY FOUND        Patients Insurance / [facility]

## 2025-06-27 ENCOUNTER — OFFICE VISIT (OUTPATIENT)
Age: 50
End: 2025-06-27
Payer: COMMERCIAL

## 2025-06-27 VITALS
HEART RATE: 64 BPM | OXYGEN SATURATION: 98 % | RESPIRATION RATE: 18 BRPM | BODY MASS INDEX: 37.59 KG/M2 | SYSTOLIC BLOOD PRESSURE: 104 MMHG | TEMPERATURE: 97.8 F | DIASTOLIC BLOOD PRESSURE: 68 MMHG | HEIGHT: 68 IN | WEIGHT: 248 LBS

## 2025-06-27 DIAGNOSIS — J44.9 CHRONIC OBSTRUCTIVE PULMONARY DISEASE, UNSPECIFIED COPD TYPE (HCC): Primary | ICD-10-CM

## 2025-06-27 DIAGNOSIS — I25.810 CORONARY ARTERY DISEASE INVOLVING CORONARY BYPASS GRAFT OF NATIVE HEART WITHOUT ANGINA PECTORIS: ICD-10-CM

## 2025-06-27 DIAGNOSIS — K21.9 GASTROESOPHAGEAL REFLUX DISEASE WITHOUT ESOPHAGITIS: ICD-10-CM

## 2025-06-27 PROCEDURE — 99214 OFFICE O/P EST MOD 30 MIN: CPT | Performed by: STUDENT IN AN ORGANIZED HEALTH CARE EDUCATION/TRAINING PROGRAM

## 2025-06-27 RX ORDER — TIOTROPIUM BROMIDE 18 UG/1
18 CAPSULE ORAL; RESPIRATORY (INHALATION) DAILY
Qty: 30 CAPSULE | Refills: 5 | Status: SHIPPED | OUTPATIENT
Start: 2025-06-27 | End: 2025-06-27 | Stop reason: ALTCHOICE

## 2025-06-27 RX ORDER — TIOTROPIUM BROMIDE INHALATION SPRAY 3.12 UG/1
2 SPRAY, METERED RESPIRATORY (INHALATION) DAILY
Qty: 4 G | Refills: 2 | Status: SHIPPED | OUTPATIENT
Start: 2025-06-27

## 2025-06-27 NOTE — ASSESSMENT & PLAN NOTE
Complains of coughing every morning with significant amount of sputum to expectorate. No SOB. Was previously on albuterol, advair, and spiriva by Pulmonology but has not seen them for many years. No PFTs available for review - reports last ones were done > 10 years ago. Suspect he may be experiencing symptoms related to COPD.  Start spiriva  Patient declines albuterol inhaler as he reports he could not tolerate side effects in the past  Check PFTs    Orders:    Complete PFT with post bronchodilator; Future    tiotropium (Spiriva Respimat) 2.5 MCG/ACT AERS inhaler; Inhale 2 puffs daily

## 2025-06-27 NOTE — PROGRESS NOTES
Name: Mart Edward      : 1975      MRN: [id number]  Encounter Provider: Nahum Donis MD  Encounter Date: 2025   Encounter department: [facility] [place]  :  Assessment & Plan  Chronic obstructive pulmonary disease, unspecified COPD type (HCC)  Complains of coughing every morning with significant amount of sputum to expectorate. No SOB. Was previously on albuterol, advair, and spiriva by Pulmonology but has not seen them for many years. No PFTs available for review - reports last ones were done > 10 years ago. Suspect he may be experiencing symptoms related to COPD.  Start spiriva  Patient declines albuterol inhaler as he reports he could not tolerate side effects in the past  Check PFTs    Orders:    Complete PFT with post bronchodilator; Future    tiotropium (Spiriva Respimat) 2.5 MCG/ACT AERS inhaler; Inhale 2 puffs daily    Coronary artery disease involving coronary bypass graft of native heart without angina pectoris  Had recent stress test that showed no new changes. He also has history of ICM and is pending repeat ECHO in July to check EF after starting optimal GDMT to see if he needs ICD. Continue Cardiology follow up.       Gastroesophageal reflux disease without esophagitis  He is pending EGD (also colonoscopy) but has been awaiting cardiac clearance. He will follow up with Cardiology regarding clearance for endoscopy once his ECHO is complete. Continue lansoprazole 20 mg qd.              History of Present Illness   Mart Edward is a 50 yo M with PMH of CAD s/p CABG, COPD, GERD, [place] disease, seizure who presents today for follow up. He reports recently he will wake up coughing with a lot of mucus to expectorate. Can last for 30 minutes before resolving. Was previously on albuterol, spiriva, and advair by Pulmonology but has not seen them in many years. PFTs reportedly done > 10 years ago.      Review of Systems   Respiratory:  Positive for cough. Negative for  "shortness of breath.    Cardiovascular:  Negative for chest pain and leg swelling.   Gastrointestinal:  Negative for abdominal pain.   Neurological:  Negative for dizziness, light-headedness and headaches.       Objective   /68 (BP Location: Left arm, Patient Position: Sitting, Cuff Size: Standard)   Pulse 64   Temp 97.8 °F (36.6 °C) (Tympanic)   Resp 18   Ht 5' 8\" (1.727 m)   Wt 112 kg (248 lb)   SpO2 98%   BMI 37.71 kg/m²      Physical Exam  Constitutional:       General: He is not in acute distress.    Eyes:      Conjunctiva/sclera: Conjunctivae normal.       Cardiovascular:      Rate and Rhythm: Normal rate and regular rhythm.      Heart sounds: Normal heart sounds.   Pulmonary:      Effort: Pulmonary effort is normal.      Breath sounds: Normal breath sounds.     Musculoskeletal:      Right lower leg: No edema.      Left lower leg: No edema.     Skin:     General: Skin is warm and dry.     Neurological:      Mental Status: He is alert.     Psychiatric:         Speech: Speech normal.         Behavior: Behavior normal. Behavior is cooperative.         "

## 2025-06-27 NOTE — ASSESSMENT & PLAN NOTE
He is pending EGD (also colonoscopy) but has been awaiting cardiac clearance. He will follow up with Cardiology regarding clearance for endoscopy once his ECHO is complete. Continue lansoprazole 20 mg qd.

## 2025-06-27 NOTE — ASSESSMENT & PLAN NOTE
Had recent stress test that showed no new changes. He also has history of ICM and is pending repeat ECHO in July to check EF after starting optimal GDMT to see if he needs ICD. Continue Cardiology follow up.

## 2025-07-04 DIAGNOSIS — I25.810 CORONARY ARTERY DISEASE INVOLVING CORONARY BYPASS GRAFT OF NATIVE HEART WITHOUT ANGINA PECTORIS: ICD-10-CM

## 2025-07-06 RX ORDER — LISINOPRIL 5 MG/1
5 TABLET ORAL DAILY
Qty: 30 TABLET | Refills: 5 | Status: SHIPPED | OUTPATIENT
Start: 2025-07-06

## 2025-07-08 ENCOUNTER — HOSPITAL ENCOUNTER (OUTPATIENT)
Dept: NON INVASIVE DIAGNOSTICS | Facility: HOSPITAL | Age: 50
Discharge: HOME | End: 2025-07-08
Attending: INTERNAL MEDICINE
Payer: COMMERCIAL

## 2025-07-08 VITALS
HEART RATE: 64 BPM | BODY MASS INDEX: 37.59 KG/M2 | SYSTOLIC BLOOD PRESSURE: 104 MMHG | HEIGHT: 68 IN | WEIGHT: 248 LBS | DIASTOLIC BLOOD PRESSURE: 68 MMHG

## 2025-07-08 DIAGNOSIS — I25.5 ISCHEMIC CARDIOMYOPATHY: ICD-10-CM

## 2025-07-08 DIAGNOSIS — I25.810 CORONARY ARTERY DISEASE INVOLVING CORONARY BYPASS GRAFT OF NATIVE HEART WITHOUT ANGINA PECTORIS: ICD-10-CM

## 2025-07-08 LAB
AORTIC ROOT: 2.7 CM
ASCENDING AORTA: 2.7 CM
BSA FOR ECHO PROCEDURE: 2.24 M2
E WAVE DECELERATION TIME: 220 MS
E/A RATIO: 0.91
FRACTIONAL SHORTENING: 15 (ref 28–44)
INTERVENTRICULAR SEPTUM IN DIASTOLE (PARASTERNAL SHORT AXIS VIEW): 0.9 CM
INTERVENTRICULAR SEPTUM: 0.9 CM (ref 0.6–1.1)
LEFT ATRIUM SIZE: 4 CM
LEFT INTERNAL DIMENSION IN SYSTOLE: 5.1 CM (ref 2.1–4)
LEFT VENTRICULAR INTERNAL DIMENSION IN DIASTOLE: 6 CM (ref 3.5–6)
LEFT VENTRICULAR POSTERIOR WALL IN END DIASTOLE: 0.9 CM
LEFT VENTRICULAR STROKE VOLUME: 56 ML
LV EF US.2D.A4C+ESTIMATED: 39 %
LVSV (TEICH): 56 ML
MV E'TISSUE VEL-SEP: 7 CM/S
MV PEAK A VEL: 0.55 M/S
MV PEAK E VEL: 50 CM/S
MV STENOSIS PRESSURE HALF TIME: 64 MS
MV VALVE AREA P 1/2 METHOD: 3.44
SL CV PED ECHO LEFT VENTRICLE DIASTOLIC VOLUME (MOD BIPLANE) 2D: 179 ML
SL CV PED ECHO LEFT VENTRICLE SYSTOLIC VOLUME (MOD BIPLANE) 2D: 123 ML
TRICUSPID ANNULAR PLANE SYSTOLIC EXCURSION: 1.4 CM

## 2025-07-08 PROCEDURE — 93306 TTE W/DOPPLER COMPLETE: CPT | Performed by: INTERNAL MEDICINE

## 2025-07-08 PROCEDURE — 93306 TTE W/DOPPLER COMPLETE: CPT

## 2025-07-08 RX ADMIN — PERFLUTREN 0.6 ML/MIN: 6.52 INJECTION, SUSPENSION INTRAVENOUS at 09:30

## 2025-07-09 ENCOUNTER — RESULTS FOLLOW-UP (OUTPATIENT)
Dept: CARDIOLOGY CLINIC | Facility: CLINIC | Age: 50
End: 2025-07-09

## 2025-07-31 ENCOUNTER — HOSPITAL ENCOUNTER (OUTPATIENT)
Dept: PULMONOLOGY | Facility: HOSPITAL | Age: 50
Discharge: HOME | End: 2025-07-31
Payer: COMMERCIAL

## 2025-07-31 DIAGNOSIS — J44.9 CHRONIC OBSTRUCTIVE PULMONARY DISEASE, UNSPECIFIED COPD TYPE (HCC): ICD-10-CM

## 2025-07-31 PROCEDURE — 94726 PLETHYSMOGRAPHY LUNG VOLUMES: CPT | Performed by: INTERNAL MEDICINE

## 2025-07-31 PROCEDURE — 94729 DIFFUSING CAPACITY: CPT

## 2025-07-31 PROCEDURE — 94729 DIFFUSING CAPACITY: CPT | Performed by: INTERNAL MEDICINE

## 2025-07-31 PROCEDURE — 94726 PLETHYSMOGRAPHY LUNG VOLUMES: CPT

## 2025-07-31 PROCEDURE — 94760 N-INVAS EAR/PLS OXIMETRY 1: CPT

## 2025-07-31 PROCEDURE — 94060 EVALUATION OF WHEEZING: CPT

## 2025-07-31 PROCEDURE — 94060 EVALUATION OF WHEEZING: CPT | Performed by: INTERNAL MEDICINE

## 2025-07-31 RX ORDER — ALBUTEROL SULFATE 0.83 MG/ML
2.5 SOLUTION RESPIRATORY (INHALATION) ONCE
Status: COMPLETED | OUTPATIENT
Start: 2025-07-31 | End: 2025-07-31

## 2025-07-31 RX ADMIN — ALBUTEROL SULFATE 2.5 MG: 2.5 SOLUTION RESPIRATORY (INHALATION) at 07:47

## 2025-08-08 RX ORDER — HYDROCORTISONE 10 MG/1
TABLET ORAL
Qty: 120 TABLET | Refills: 2 | OUTPATIENT
Start: 2025-08-08

## (undated) DEVICE — PINNACLE INTRODUCER SHEATH: Brand: PINNACLE

## (undated) DEVICE — CATH DIAG 5FR IMPULSE 100CM FL4

## (undated) DEVICE — CATH DIAG 5FR .045 100CM FR4

## (undated) DEVICE — MICROPUNCTURE INTRODUCER SET SILHOUETTE TRANSITIONLESS PUSH-PLUS DESIGN - STIFFENED CANNULA WITH NITINOL WIRE GUIDE: Brand: MICROPUNCTURE

## (undated) DEVICE — CATH DIAG 5FR IMPULSE 100CM FL3.5

## (undated) DEVICE — DGW .035 FC J3MM 260CM TEF: Brand: EMERALD